# Patient Record
Sex: FEMALE | Race: OTHER | HISPANIC OR LATINO | Employment: PART TIME | URBAN - METROPOLITAN AREA
[De-identification: names, ages, dates, MRNs, and addresses within clinical notes are randomized per-mention and may not be internally consistent; named-entity substitution may affect disease eponyms.]

---

## 2021-06-10 ENCOUNTER — HOSPITAL ENCOUNTER (EMERGENCY)
Facility: HOSPITAL | Age: 36
Discharge: HOME | End: 2021-06-10
Attending: EMERGENCY MEDICINE | Admitting: EMERGENCY MEDICINE
Payer: MEDICARE

## 2021-06-10 VITALS
OXYGEN SATURATION: 100 % | WEIGHT: 130 LBS | HEIGHT: 63 IN | HEART RATE: 70 BPM | TEMPERATURE: 98.3 F | DIASTOLIC BLOOD PRESSURE: 82 MMHG | RESPIRATION RATE: 16 BRPM | SYSTOLIC BLOOD PRESSURE: 126 MMHG | BODY MASS INDEX: 23.04 KG/M2

## 2021-06-10 DIAGNOSIS — J06.9 VIRAL UPPER RESPIRATORY TRACT INFECTION: Primary | ICD-10-CM

## 2021-06-10 LAB — SARS-COV-2 RNA RESP QL NAA+PROBE: NEGATIVE

## 2021-06-10 PROCEDURE — 87635 SARS-COV-2 COVID-19 AMP PRB: CPT | Performed by: EMERGENCY MEDICINE

## 2021-06-10 PROCEDURE — 99282 EMERGENCY DEPT VISIT SF MDM: CPT | Performed by: EMERGENCY MEDICINE

## 2021-06-10 PROCEDURE — 99283 EMERGENCY DEPT VISIT LOW MDM: CPT

## 2021-06-10 NOTE — ED PROVIDER NOTES
History  Chief Complaint   Patient presents with    Sore Throat     Pt reports "itchy throat" and runny nose since Sunday  This is a 57-year-old female presenting to the ED for evaluation of sore, itchy throat with runny nose and nasal congestion for the past 4 days since Sunday  Denies any fevers or chills, no vomiting or diarrhea  She is concerned that she and her kids may have COVID-23 and would like to be tested  History provided by:  Patient   used: No        None       History reviewed  No pertinent past medical history  History reviewed  No pertinent surgical history  History reviewed  No pertinent family history  I have reviewed and agree with the history as documented  E-Cigarette/Vaping    E-Cigarette Use Never User      E-Cigarette/Vaping Substances     Social History     Tobacco Use    Smoking status: Never Smoker    Smokeless tobacco: Never Used   Substance Use Topics    Alcohol use: Yes     Frequency: Monthly or less    Drug use: Never       Review of Systems   All other systems reviewed and are negative  Physical Exam  Physical Exam  Vitals signs and nursing note reviewed  Constitutional:       Appearance: Normal appearance  She is well-developed and normal weight  HENT:      Head: Normocephalic and atraumatic  Right Ear: External ear normal       Left Ear: External ear normal       Nose: Nose normal       Mouth/Throat:      Mouth: Mucous membranes are moist  No oral lesions  Pharynx: Oropharynx is clear  No pharyngeal swelling, oropharyngeal exudate, posterior oropharyngeal erythema or uvula swelling  Tonsils: No tonsillar exudate or tonsillar abscesses  Eyes:      Extraocular Movements: Extraocular movements intact  Conjunctiva/sclera: Conjunctivae normal       Pupils: Pupils are equal, round, and reactive to light  Neck:      Musculoskeletal: Normal range of motion and neck supple     Cardiovascular:      Rate and Rhythm: Normal rate and regular rhythm  Pulses: Normal pulses  Heart sounds: Normal heart sounds  Pulmonary:      Effort: Pulmonary effort is normal  No respiratory distress  Breath sounds: Normal breath sounds  No wheezing, rhonchi or rales  Abdominal:      General: Abdomen is flat  Bowel sounds are normal  There is no distension  Palpations: Abdomen is soft  Tenderness: There is no abdominal tenderness  Musculoskeletal: Normal range of motion  General: No swelling or tenderness  Right lower leg: No edema  Left lower leg: No edema  Skin:     General: Skin is warm and dry  Capillary Refill: Capillary refill takes less than 2 seconds  Neurological:      General: No focal deficit present  Mental Status: She is alert and oriented to person, place, and time  Mental status is at baseline  Psychiatric:         Mood and Affect: Mood normal          Behavior: Behavior normal          Vital Signs  ED Triage Vitals [06/10/21 1354]   Temperature Pulse Respirations Blood Pressure SpO2   98 3 °F (36 8 °C) 70 16 126/82 100 %      Temp Source Heart Rate Source Patient Position - Orthostatic VS BP Location FiO2 (%)   Oral Monitor -- -- --      Pain Score       6           Vitals:    06/10/21 1354   BP: 126/82   Pulse: 70         Visual Acuity      ED Medications  Medications - No data to display    Diagnostic Studies  Results Reviewed     Procedure Component Value Units Date/Time    Novel Coronavirus (Covid-19),PCR [facility] - 2 Hour Stat [[id number]]  (Normal) Collected: 06/10/21 1431    Lab Status: Final result Specimen: Nares from Nasopharyngeal Swab Updated: 06/10/21 1541     SARS-CoV-2 Negative    Narrative:       The specimen collection materials, transport medium, and/or testing methodology utilized in the production of these test results have been proven to be reliable in a limited validation with an abbreviated program under the Emergency Utilization Authorization provided by the FDA  Testing reported as "Presumptive positive" will be confirmed with secondary testing to ensure result accuracy  Clinical caution and judgement should be used with the interpretation of these results with consideration of the clinical impression and other laboratory testing  Testing reported as "Positive" or "Negative" has been proven to be accurate according to standard laboratory validation requirements  All testing is performed with control materials showing appropriate reactivity at standard intervals  No orders to display              Procedures  Procedures         ED Course                                           MDM  Number of Diagnoses or Management Options  Viral upper respiratory tract infection: new and requires workup     Amount and/or Complexity of Data Reviewed  Clinical lab tests: ordered and reviewed    Risk of Complications, Morbidity, and/or Mortality  Presenting problems: low  Diagnostic procedures: low  Management options: low        Disposition  Final diagnoses:   Viral upper respiratory tract infection     Time reflects when diagnosis was documented in both MDM as applicable and the Disposition within this note     Time User Action Codes Description Comment    6/10/2021  2:22 PM Melania Rodgers Add [J06 9] Viral upper respiratory tract infection       ED Disposition     ED Disposition Condition Date/Time Comment    Discharge Stable Thu Aramis 10, 2021  2:22 PM Mary Face discharge to home/self care  Follow-up Information    None         There are no discharge medications for this patient  No discharge procedures on file      PDMP Review     None          ED Provider  Electronically Signed by           Jerrell Griffiths DO  06/10/21 9249

## 2021-06-10 NOTE — ED NOTES
Pt asked for prescription  Educated pt that there is no medication we give for COVID  Pt speaking with kids about going for ice cream   Educated pt regarding quarantining until tests result  Pt asked what if they are positive, explained there is a longer quarantine period and they would need to stay home until then  Pt asked if they can take what they have been taking  Educated pt that OTC meds for symptom control are what we recommend and if what they are taking helps that would be fine        [address], RN  06/10/21 9476

## 2021-06-10 NOTE — DISCHARGE INSTRUCTIONS
Take vitamin supplements if you have covid-19:  Vitamin D3: 2000 IU by mouth, every day  Vitamin C: 1 gram by mouth, twice a day  Zinc: 220 mg every day  Multivitamin daily

## 2021-06-10 NOTE — Clinical Note
Antoine Heath was seen and treated in our emergency department on 6/10/2021  Diagnosis:     Mane Hussein  may return to work on return date  She may return on this date: 06/16/2021    If you test positive for covid-19, you need to be out of work and isolate for 10 days from symptom onset  If you test negative for covid-19, you may return to work tomorrow as long as you are fever-free  If you have any questions or concerns, please don't hesitate to call        Arnulfo Lipscomb DO    ______________________________           _______________          _______________  Hospital Representative                              Date                                Time

## 2022-06-19 ENCOUNTER — APPOINTMENT (EMERGENCY)
Dept: RADIOLOGY | Facility: HOSPITAL | Age: 37
End: 2022-06-19
Payer: MEDICARE

## 2022-06-19 ENCOUNTER — APPOINTMENT (EMERGENCY)
Dept: CT IMAGING | Facility: HOSPITAL | Age: 37
End: 2022-06-19
Payer: MEDICARE

## 2022-06-19 ENCOUNTER — HOSPITAL ENCOUNTER (EMERGENCY)
Facility: HOSPITAL | Age: 37
Discharge: HOME | End: 2022-06-19
Attending: EMERGENCY MEDICINE | Admitting: EMERGENCY MEDICINE
Payer: MEDICARE

## 2022-06-19 VITALS
TEMPERATURE: 97.5 F | HEART RATE: 78 BPM | HEIGHT: 62 IN | RESPIRATION RATE: 16 BRPM | OXYGEN SATURATION: 100 % | BODY MASS INDEX: 25.76 KG/M2 | DIASTOLIC BLOOD PRESSURE: 84 MMHG | SYSTOLIC BLOOD PRESSURE: 135 MMHG | WEIGHT: 140 LBS

## 2022-06-19 DIAGNOSIS — S16.1XXA CERVICAL MUSCLE STRAIN, INITIAL ENCOUNTER: ICD-10-CM

## 2022-06-19 DIAGNOSIS — M54.2 NECK PAIN ON RIGHT SIDE: Primary | ICD-10-CM

## 2022-06-19 DIAGNOSIS — M54.10 RADICULOPATHY: ICD-10-CM

## 2022-06-19 DIAGNOSIS — M62.838 TRAPEZIUS MUSCLE SPASM: ICD-10-CM

## 2022-06-19 LAB
EXT PREG TEST URINE: NEGATIVE
EXT. CONTROL ED NAV: NORMAL

## 2022-06-19 PROCEDURE — 81025 URINE PREGNANCY TEST: CPT | Performed by: STUDENT IN AN ORGANIZED HEALTH CARE EDUCATION/TRAINING PROGRAM

## 2022-06-19 PROCEDURE — 99284 EMERGENCY DEPT VISIT MOD MDM: CPT

## 2022-06-19 PROCEDURE — 96372 THER/PROPH/DIAG INJ SC/IM: CPT

## 2022-06-19 PROCEDURE — 20552 NJX 1/MLT TRIGGER POINT 1/2: CPT | Performed by: STUDENT IN AN ORGANIZED HEALTH CARE EDUCATION/TRAINING PROGRAM

## 2022-06-19 PROCEDURE — 71045 X-RAY EXAM CHEST 1 VIEW: CPT

## 2022-06-19 PROCEDURE — 93005 ELECTROCARDIOGRAM TRACING: CPT

## 2022-06-19 PROCEDURE — 99284 EMERGENCY DEPT VISIT MOD MDM: CPT | Performed by: STUDENT IN AN ORGANIZED HEALTH CARE EDUCATION/TRAINING PROGRAM

## 2022-06-19 PROCEDURE — 72125 CT NECK SPINE W/O DYE: CPT

## 2022-06-19 PROCEDURE — G1004 CDSM NDSC: HCPCS

## 2022-06-19 RX ORDER — METHOCARBAMOL 500 MG/1
500 TABLET, FILM COATED ORAL 2 TIMES DAILY
Qty: 20 TABLET | Refills: 0 | Status: SHIPPED | OUTPATIENT
Start: 2022-06-19

## 2022-06-19 RX ORDER — ACETAMINOPHEN 500 MG
1000 TABLET ORAL EVERY 6 HOURS PRN
Qty: 40 TABLET | Refills: 0 | Status: SHIPPED | OUTPATIENT
Start: 2022-06-19

## 2022-06-19 RX ORDER — NAPROXEN 250 MG/1
250 TABLET ORAL 2 TIMES DAILY PRN
Qty: 30 TABLET | Refills: 0 | Status: SHIPPED | OUTPATIENT
Start: 2022-06-19

## 2022-06-19 RX ORDER — ROPIVACAINE HYDROCHLORIDE 5 MG/ML
5 INJECTION, SOLUTION EPIDURAL; INFILTRATION; PERINEURAL ONCE
Status: COMPLETED | OUTPATIENT
Start: 2022-06-19 | End: 2022-06-19

## 2022-06-19 RX ORDER — KETOROLAC TROMETHAMINE 30 MG/ML
15 INJECTION, SOLUTION INTRAMUSCULAR; INTRAVENOUS ONCE
Status: COMPLETED | OUTPATIENT
Start: 2022-06-19 | End: 2022-06-19

## 2022-06-19 RX ORDER — TRAMADOL HYDROCHLORIDE 50 MG/1
50 TABLET ORAL ONCE
Status: COMPLETED | OUTPATIENT
Start: 2022-06-19 | End: 2022-06-19

## 2022-06-19 RX ORDER — DIAZEPAM 5 MG/1
5 TABLET ORAL ONCE
Status: COMPLETED | OUTPATIENT
Start: 2022-06-19 | End: 2022-06-19

## 2022-06-19 RX ORDER — LIDOCAINE 50 MG/G
1 PATCH TOPICAL ONCE
Status: DISCONTINUED | OUTPATIENT
Start: 2022-06-19 | End: 2022-06-19 | Stop reason: HOSPADM

## 2022-06-19 RX ADMIN — ROPIVACAINE HYDROCHLORIDE 5 ML: 5 INJECTION, SOLUTION EPIDURAL; INFILTRATION; PERINEURAL at 07:56

## 2022-06-19 RX ADMIN — LIDOCAINE 5% 1 PATCH: 700 PATCH TOPICAL at 05:14

## 2022-06-19 RX ADMIN — TRAMADOL HYDROCHLORIDE 50 MG: 50 TABLET, COATED ORAL at 06:14

## 2022-06-19 RX ADMIN — KETOROLAC TROMETHAMINE 15 MG: 30 INJECTION, SOLUTION INTRAMUSCULAR at 05:12

## 2022-06-19 RX ADMIN — DIAZEPAM 5 MG: 5 TABLET ORAL at 05:11

## 2022-06-19 NOTE — ED ATTENDING ATTESTATION
6/19/2022      Final Diagnosis:  1  Neck pain on right side    2  Radiculopathy    3  Cervical muscle strain, initial encounter    4  Trapezius muscle spasm      ED Course as of 06/19/22 0743   Sun Jun 19, 2022   9262 S/o Pain in neck for a while  Now with pain, subjective weakness, paresthesias  Cervical radiculopathy  Paresthesias in fingers  Toradol, valium, ultram given  0731  3 months of paroxysmal pain of right trapezius pain  ocassional twitching of her right pinkie  Now with worsening radiculopathy like symptoms over last couple days  No inciting event  Patient has a normal examination regards to muscular strength and sensation  Will obtain ECG, CXR, will refer to comprehensive spine  Bud Nolen DO, saw and evaluated the patient  All available labs and X-rays were ordered by me or the resident and have been reviewed by myself  I discussed the patient with the resident / non-physician and agree with the resident's / non-physician practitioner's findings and plan as documented in the resident's / non-physician practicitioner's note, except where noted  At this point, I agree with the current assessment done in the ED  I was present during key portions of all procedures performed unless otherwise stated  Chief Complaint   Patient presents with    Arm Pain     Pt states has had right arm pain since yesterday awoke this morning with increasing pain, and tingling sensation  Denies weakness     This is a 43 y o  female presenting for evaluation of right trapezius pain for 3 months paroxysmally  Now with radiculopathy like symptoms for last 2 days  No inciting event  No red flags  Notes the pain in her right neck occasionally radiates to the right lateral anterior chest   Primarily radiates down the right arm  Pain is worse with palpation in the neck and movement of her head  Primarily complaining of paresthesias and pain  No PMH of PSH      PMH:   has no past medical history on file  PSH:   has no past surgical history on file  Social:  Social History     Substance and Sexual Activity   Alcohol Use Yes     Social History     Tobacco Use   Smoking Status Never Smoker   Smokeless Tobacco Never Used     Social History     Substance and Sexual Activity   Drug Use Never     PE:  Vitals:    06/19/22 0443 06/19/22 0718   BP: 148/83 135/84   BP Location: Right arm Right arm   Pulse: 64 78   Resp: 16 16   Temp: 97 5 °F (36 4 °C)    TempSrc: Oral    SpO2: 100% 100%   Weight: 63 5 kg (140 lb)    Height: 5' 2" (1 575 m)      Physical Exam     Physical Exam  Vitals and nursing note reviewed  Constitutional:       General: She is not in acute distress  Appearance: Normal appearance  She is not ill-appearing  HENT:      Head: Normocephalic and atraumatic  Right Ear: External ear normal       Left Ear: External ear normal       Nose: Nose normal       Mouth/Throat:      Mouth: Mucous membranes are moist    Eyes:      General:         Right eye: No discharge  Left eye: No discharge  Conjunctiva/sclera: Conjunctivae normal    Cardiovascular:      Rate and Rhythm: Normal rate and regular rhythm  Pulses: Normal pulses  Heart sounds: No murmur heard  Comments: Normal radial pulses  Pulmonary:      Effort: Pulmonary effort is normal       Breath sounds: Normal breath sounds  Abdominal:      General: Abdomen is flat  There is no distension  Tenderness: There is no abdominal tenderness  Musculoskeletal:         General: Tenderness present  Normal range of motion  Cervical back: Normal range of motion  Comments: Pain on palpation of the right lateral neck, pain on palpation of the right trapezius, pain on palpation of the right musculature overlying the scapula  No central spinal tenderness  Skin:     General: Skin is warm  Capillary Refill: Capillary refill takes less than 2 seconds  Findings: No rash     Neurological: General: No focal deficit present  Mental Status: She is alert  Mental status is at baseline  Sensory: No sensory deficit  Motor: No weakness  Comments: Sensation is intact to light touch throughout the upper extremities including on the right arm  Median, radial, ulnar nerves are intact to motor and sensation  Psychiatric:         Mood and Affect: Mood normal          Behavior: Behavior normal            A:  - likely musculoskeletal pain in nature  Patient has exquisite pain on palpation in the right trapezius which had appears to be spasms on examination  Patient also has pain on palpation of the muscles overlying the right scapula and the right lateral cervical spine  Normal MSK stength and neuro exam  Normal pulses in right upper extremity  Reportedly radiates to the right lateral chest wall and the right arm  Worse with movement  MDM:  Likely musculoskeletal in nature  Likely radiculopathy from spasm  Differential also includes stenosis  Radiation of the chest will evaluate for pneumonia, pneumothorax, other acute intrathoracic process  Evaluate for arrhythmia  P:  - patient received benzodiazepine, NSAID, lidocaine patch, narcotic prior to my arrival    - awaiting results of CT of the neck on my arrival   CT of the neck without significant findings     -Will perform trigger point injections  With radiation to the lateral chest will add chest x-ray to evaluate for pneumonia, pneumothorax  Will evaluate with ECG to evaluate for arrhythmia  Workup without acute findings  Will discharge home  Follow-up with comprehensive spine due to length of time that the patient has been having neck pain and the mild degenerative changes or found on the CT scan     - 13 point ROS was performed and all are normal unless stated in the history above  - Nursing note reviewed  Vitals reviewed     - Orders placed by myself and/or advanced practitioner / resident     - Previous chart was reviewed  - No language barrier    - History obtained from patient  - There are no limitations to the history obtained  Code Status: No Order  Advance Directive and Living Will:      Power of :    POLST:      Medications   lidocaine (LIDODERM) 5 % patch 1 patch (1 patch Topical Medication Applied 6/19/22 0514)   ketorolac (TORADOL) injection 15 mg (15 mg Intramuscular Given 6/19/22 0512)   diazepam (VALIUM) tablet 5 mg (5 mg Oral Given 6/19/22 0511)   traMADol (ULTRAM) tablet 50 mg (50 mg Oral Given 6/19/22 0614)   ropivacaine (NAROPIN) 0 5 % injection 5 mL (5 mL Infiltration Given by Other 6/19/22 0756)     XR chest 1 view portable   ED Interpretation   No acute cardiopulmonary process  CT cervical spine without contrast   Final Result      No fracture or traumatic malalignment  Bilateral paranasal sinus opacification with air-fluid levels  Acute sinusitis should be considered in the right clinical setting  Workstation performed: [id number]           Orders Placed This Encounter   Procedures        Trigger Injection 1 or 2 muscles    CT cervical spine without contrast    XR chest 1 view portable    Ambulatory Referral to [facility]    POCT pregnancy, urine    ECG 12 lead     Labs Reviewed   POCT PREGNANCY, URINE - Normal       Result Value Ref Range Status    EXT PREG TEST UR (Ref: Negative) negative   Final    Control valid   Final     Time reflects when diagnosis was documented in both MDM as applicable and the Disposition within this note     Time User Action Codes Description Comment    6/19/2022  7:38 AM Roanna Cashing Add [M54 2] Neck pain on right side     6/19/2022  7:38 AM Roanna Cashing Add [M54 10] Radiculopathy     6/19/2022  7:59 AM Homar Rios Add [S16  1XXA] Cervical muscle strain, initial encounter     6/19/2022  7:59 AM Roanna Cashing Add [F29 617] Trapezius muscle spasm       ED Disposition     ED Disposition Discharge    Condition   Stable    Date/Time   Sun Jun 19, 2022  7:58 AM    Comment   Landon Umanzor discharge to home/self care  Follow-up Information     Follow up With Specialties Details Why Contact Info Additional Information    [facility] Physical Therapy Schedule an appointment as soon as possible for a visit  For re-evaluation as soon as possible     [address] Emergency Department Emergency Medicine Schedule an appointment as soon as possible for a visit  If symptoms worsen [address] [facility],[phone]  [address] [place] [address] Emergency Department, [address], [address]        Discharge Medication List as of 6/19/2022  8:02 AM      START taking these medications    Details   acetaminophen (TYLENOL) 500 mg tablet Take 2 tablets (1,000 mg total) by mouth every 6 (six) hours as needed for mild pain for up to 20 doses, Starting Sun 6/19/2022, Normal      methocarbamol (ROBAXIN) 500 mg tablet Take 1 tablet (500 mg total) by mouth 2 (two) times a day, Starting Sun 6/19/2022, Normal      naproxen (Naprosyn) 250 mg tablet Take 1 tablet (250 mg total) by mouth 2 (two) times a day as needed for mild pain for up to 30 doses, Starting Sun 6/19/2022, Normal             None       Portions of the record may have been created with voice recognition software  Occasional wrong word or "sound a like" substitutions may have occurred due to the inherent limitations of voice recognition software  Read the chart carefully and recognize, using context, where substitutions have occurred        Critical Care Time  Trigger Injection 1 or 2 muscles    Date/Time: 6/19/2022 7:56 AM  Performed by: Taylor Robledo DO  Authorized by: Taylor Robledo DO     Patient location:  ED  Other Assisting Provider: No    Consent:     Consent obtained:  Verbal    Consent given by:  Patient    Risks discussed: Allergic reaction, infection, nerve damage, bleeding, pain and unsuccessful block    Alternatives discussed:  No treatment  Universal protocol:     Procedure explained and questions answered to patient or proxy's satisfaction: yes      Relevant documents present and verified: yes      Test results available and properly labeled: yes      Radiology Images displayed and confirmed  If images not available, report reviewed : yes      Required blood products, implants, devices, and special equipment available: no      Site marked: no      Time out called: yes      Patient identity confirmed:  Verbally with patient  Location:     Body area:  Neck    Injection Trigger Points:  3  Pre-procedure details:     Skin preparation:  Antiseptic wash  Skin anesthesia:     Skin anesthesia method:  None  Procedure details:     Needle gauge:  27 G    Block anesthetic: ropivicaine 0 5  Steroid injected:  None    Additive injected:  None    Injection procedure:  Anatomic landmarks identified, incremental injection, negative aspiration for blood, anatomic landmarks palpated and introduced needle  Post-procedure details:     Dressing:  None    Outcome:  Pain unchanged    Patient tolerance of procedure:   Tolerated well, no immediate complications  Comments:      3 ml ropivicaine given  total

## 2022-06-19 NOTE — Clinical Note
Swapnil Lackeyhand was seen and treated in our emergency department on 6/19/2022  Diagnosis: Cervical strain    Minnie Cullen  may return to work on return date  She may return on this date: 06/21/2022         If you have any questions or concerns, please don't hesitate to call        Gloria Galan DO    ______________________________           _______________          _______________  Hospital Representative                              Date                                Time

## 2022-06-19 NOTE — ED PROVIDER NOTES
History  Chief Complaint   Patient presents with    Arm Pain     Pt states has had right arm pain since yesterday awoke this morning with increasing pain, and tingling sensation  Denies weakness     Patient is a 78-year-old female presents emergency department today with complaint of right shoulder and arm pain x1 hour  Patient states over the past 3 months she has been having intermittent pain in the back of her right shoulder but upon wakening this morning noticed worsening of pain that radiates into her right arm, described as sharp, rated 9/10, constant  She also admits to associated tingling and pins and needles sensation in her fingers  States she took Tylenol for pain yesterday morning but has not taken anything since  She denies any chest pain, fevers or chills, abdominal pain, nausea vomiting, weakness, headache, blue or pale discoloration of the fingertips, decreased range of motion of the affected arm     She denies any recent trauma or injuries and denies any heavy lifting  States she may have slept wrong  She denies any red flag symptoms including bowel or bladder incontinence, urinary retention, saddle anesthesia, history of IV drug use  None       History reviewed  No pertinent past medical history  History reviewed  No pertinent surgical history  History reviewed  No pertinent family history  I have reviewed and agree with the history as documented  E-Cigarette/Vaping    E-Cigarette Use Never User      E-Cigarette/Vaping Substances     Social History     Tobacco Use    Smoking status: Never Smoker    Smokeless tobacco: Never Used   Vaping Use    Vaping Use: Never used   Substance Use Topics    Alcohol use: Yes    Drug use: Never       Review of Systems   Constitutional: Negative for chills and fever  Respiratory: Negative for cough, chest tightness and shortness of breath  Cardiovascular: Negative for chest pain     Gastrointestinal: Negative for abdominal pain, diarrhea, nausea and vomiting  Genitourinary: Negative  Musculoskeletal: Positive for arthralgias (Right shoulder and arm) and neck pain  Negative for back pain and neck stiffness  Neurological: Positive for numbness  Negative for dizziness, weakness, light-headedness and headaches  All other systems reviewed and are negative  Physical Exam  Physical Exam  Vitals and nursing note reviewed  Constitutional:       General: She is not in acute distress  Appearance: Normal appearance  She is not ill-appearing  HENT:      Head: Normocephalic and atraumatic  Eyes:      Extraocular Movements: Extraocular movements intact  Conjunctiva/sclera: Conjunctivae normal       Pupils: Pupils are equal, round, and reactive to light  Neck:        Comments: Midline cervical and right-sided paraspinal and lateral muscular tenderness of the upper trapezius extending into the right shoulder, no palpable bony step-offs  Cardiovascular:      Rate and Rhythm: Normal rate and regular rhythm  Pulses:           Radial pulses are 2+ on the right side and 2+ on the left side  Heart sounds: Normal heart sounds  No murmur heard  Pulmonary:      Effort: Pulmonary effort is normal  No respiratory distress  Breath sounds: Normal breath sounds  No decreased breath sounds or wheezing  Chest:      Chest wall: No tenderness  Abdominal:      General: Bowel sounds are normal       Palpations: Abdomen is soft  Tenderness: There is no abdominal tenderness  There is no guarding or rebound  Negative signs include Padilla's sign  Musculoskeletal:      Cervical back: Neck supple  Spasms, tenderness and bony tenderness present  No rigidity  Pain with movement and muscular tenderness present  No spinous process tenderness  Thoracic back: Spasms, tenderness and bony tenderness present  Normal range of motion  Lumbar back: Normal       Right lower leg: No edema  Left lower leg: No edema  Comments: Tenderness palpation of the the lower C and upper T-spine with right sided paraspinal tendernes, no palpable step-offs  Right-sided paraspinal muscles are tense consistent with possible muscle spasm   Lymphadenopathy:      Cervical: No cervical adenopathy  Neurological:      General: No focal deficit present  Mental Status: She is alert and oriented to person, place, and time  Sensory: Sensation is intact  No sensory deficit  Motor: Motor function is intact  No weakness  Comments: Muscle strength 5/5 in bilateral upper extremities with  strength and shoulder/elbow flexion extension  Full sensation to light touch felt in upper extremities bilaterally without deficit  Neurovascularly intact upper extremities  Psychiatric:         Attention and Perception: Attention normal          Mood and Affect: Mood normal          Speech: Speech normal          Behavior: Behavior normal  Behavior is cooperative  Thought Content:  Thought content normal          Judgment: Judgment normal          Vital Signs  ED Triage Vitals [06/19/22 0443]   Temperature Pulse Respirations Blood Pressure SpO2   97 5 °F (36 4 °C) 64 16 148/83 100 %      Temp Source Heart Rate Source Patient Position - Orthostatic VS BP Location FiO2 (%)   Oral Monitor Lying Right arm --      Pain Score       10 - Worst Possible Pain           Vitals:    06/19/22 0443   BP: 148/83   Pulse: 64   Patient Position - Orthostatic VS: Lying         Visual Acuity      ED Medications  Medications   lidocaine (LIDODERM) 5 % patch 1 patch (1 patch Topical Medication Applied 6/19/22 0514)   ketorolac (TORADOL) injection 15 mg (15 mg Intramuscular Given 6/19/22 0512)   diazepam (VALIUM) tablet 5 mg (5 mg Oral Given 6/19/22 0511)   traMADol (ULTRAM) tablet 50 mg (50 mg Oral Given 6/19/22 0107)       Diagnostic Studies  Results Reviewed     Procedure Component Value Units Date/Time    POCT pregnancy, urine [[id number]]  (Normal) Resulted: 06/19/22 0510    Lab Status: Final result Updated: 06/19/22 0511     EXT PREG TEST UR (Ref: Negative) negative     Control valid                 CT cervical spine without contrast    (Results Pending)              Procedures  Procedures         ED Course  ED Course as of 06/19/22 0700   Sun Jun 19, 2022   0608 On re-evaluation patient notes no improvement in pain, significant tenderness to palpation of the lower C-spine and paraspinal muscles   0651 On re-evaluation patient states her pain is slightly improved, still complaining of paresthesias in the right hand  Transfer of care given to Dr Michelle Rollins pending CT results and disposition  PERC Rule for PE    Flowsheet Row Most Recent Value   PERC Rule for PE    Age >=50 0 Filed at: 06/19/2022 0546   HR >=100 0 Filed at: 06/19/2022 0546   O2 Sat on room air < 95% 0 Filed at: 06/19/2022 0546   History of PE or DVT 0 Filed at: 06/19/2022 0546   Recent trauma or surgery 0 Filed at: 06/19/2022 0546   Hemoptysis 0 Filed at: 06/19/2022 0546   Exogenous estrogen 0 Filed at: 06/19/2022 0546   Unilateral leg swelling 0 Filed at: 06/19/2022 0546   PERC Rule for PE Results 0 Filed at: 06/19/2022 0546                            MDM  Number of Diagnoses or Management Options  Diagnosis management comments: Patient is a 41-year-old female presents emergency department today via EMS for concern for right neck and shoulder pain radiating into her right arm  Examination showed tenderness palpation of the lower C and upper T-spine with right-sided paraspinal muscles along with tenderness extending into the right shoulder and down the arm, muscle strength is 5/5 in upper and lower extremities bilaterally with full sensations to light touch, neurovascularly intact upper extremities, no noted deficits but pt complaining of paresthesias  Right-sided paraspinal muscles were tense concerning and consistent with possible muscle spasm  No red flag symptoms  Plan to treat conservatively for muscle spasm with lidocaine patch, Toradol, and Valium  Urine pregnancy ordered and was negative  I considered ACS but patient is a 73-year-old female with no prior or current cardiac history or risk factors and no chest pain or shortness of breath  I considered PE but patient has a PERC score of 0 and no shortness of breath or inspiratory chest pain  Considered gallbladder etiology due to location of patient's pain concerning for possible referred pain patient has right upper quadrant abdominal tenderness  On re-evaluation patient had no improvement of symptoms, due to complaints of paresthesias with midline tenderness plan to obtain CT neck to determine likely cause of radicular pain  Transfer of care given to Dr Jorge Alberto Ryan pending CT C spine, reevaluation and dispo  Amount and/or Complexity of Data Reviewed  Clinical lab tests: reviewed and ordered  Tests in the radiology section of CPT®: ordered    Patient Progress  Patient progress: stable      Disposition  Final diagnoses:   None     ED Disposition     None      Follow-up Information    None         Patient's Medications    No medications on file       No discharge procedures on file      PDMP Review     None          ED Provider  Electronically Signed by           Avril Jones PA-C  06/19/22 2051

## 2022-06-20 ENCOUNTER — TELEPHONE (OUTPATIENT)
Dept: PHYSICAL THERAPY | Facility: OTHER | Age: 37
End: 2022-06-20

## 2022-06-20 ENCOUNTER — NURSE TRIAGE (OUTPATIENT)
Dept: PHYSICAL THERAPY | Facility: OTHER | Age: 37
End: 2022-06-20

## 2022-06-20 DIAGNOSIS — M54.2 ACUTE NECK PAIN: Primary | ICD-10-CM

## 2022-06-20 LAB
ATRIAL RATE: 66 BPM
P AXIS: 76 DEGREES
PR INTERVAL: 151 MS
QRS AXIS: 66 DEGREES
QRSD INTERVAL: 71 MS
QT INTERVAL: 393 MS
QTC INTERVAL: 421 MS
T WAVE AXIS: 50 DEGREES
VENTRICULAR RATE: 69 BPM

## 2022-06-20 PROCEDURE — 93010 ELECTROCARDIOGRAM REPORT: CPT | Performed by: INTERNAL MEDICINE

## 2022-06-20 NOTE — TELEPHONE ENCOUNTER
Additional Information   Negative: Is this related to a work injury?  Negative: Is this related to an MVA?  Negative: Are you currently recieving homecare services? Background - Initial Assessment  Clinical complaint: Pain is right neck pain down right shoulder and downright arm  Has tingling in right fingers  Pain started 6/19/22   [facility]I Was seenin the ED  Date of onset: 6/19/22  Frequency of pain: constant  Quality of pain: stabbing and tingling "constantly there won't go away", and stabbing    Protocols used: [facility]B COMPREHENSIVE SPINE PROGRAM PROTOCOL

## 2022-06-20 NOTE — TELEPHONE ENCOUNTER
Additional Information   Negative: Has the patient had unexplained weight loss?  Negative: Does the patient have a fever?  Negative: Is the patient experiencing urine retention?  Negative: Has the patient experienced major trauma? (fall from height, high speed collision, direct blow to spine) and is also experiencing nausea, light-headedness, or loss of consciousness?  Negative: Is the patient experiencing blood in sputum?  Negative: Is the patient experiencing acute drop foot or paralysis?  Commented on: Background - Initial Assessment     Patient states she has had neck pain for about 3 months but it got worse 6/19/22 and now has numbness and tingling of the right arm and fingers  Protocols used: SL AMB COMPREHENSIVE SPINE PROGRAM PROTOCOL    This RN did review in detail the Comprehensive Spine Program and what we can provide for their neck pain  Patient is agreeable to being triaged by this RN and would like to proceed with Physical Therapy  Referral was placed for Physical Therapy at the [facility] site  Patients information was sent to the  to make evaluation appointment  Patient made aware that the PT office  will be calling to schedule the appointment  Patient was provided with the phone number to the PT office  No further questions and/or concerns were voiced by the patient at this time  Patient states understanding of the referral that was placed  Referral Closed

## 2022-06-27 ENCOUNTER — APPOINTMENT (EMERGENCY)
Dept: CT IMAGING | Facility: HOSPITAL | Age: 37
End: 2022-06-27
Payer: MEDICARE

## 2022-06-27 ENCOUNTER — HOSPITAL ENCOUNTER (EMERGENCY)
Facility: HOSPITAL | Age: 37
Discharge: HOME | End: 2022-06-27
Attending: EMERGENCY MEDICINE | Admitting: EMERGENCY MEDICINE
Payer: MEDICARE

## 2022-06-27 VITALS
HEART RATE: 59 BPM | SYSTOLIC BLOOD PRESSURE: 114 MMHG | TEMPERATURE: 98.1 F | OXYGEN SATURATION: 99 % | BODY MASS INDEX: 25.76 KG/M2 | DIASTOLIC BLOOD PRESSURE: 87 MMHG | RESPIRATION RATE: 18 BRPM | WEIGHT: 140 LBS | HEIGHT: 62 IN

## 2022-06-27 DIAGNOSIS — M54.13 RADICULOPATHY OF CERVICOTHORACIC REGION: Primary | ICD-10-CM

## 2022-06-27 DIAGNOSIS — M25.519 NECK AND SHOULDER PAIN: ICD-10-CM

## 2022-06-27 DIAGNOSIS — M54.2 NECK AND SHOULDER PAIN: ICD-10-CM

## 2022-06-27 LAB
ALBUMIN SERPL BCP-MCNC: 4.1 G/DL (ref 3.5–5)
ALP SERPL-CCNC: 70 U/L (ref 34–104)
ALT SERPL W P-5'-P-CCNC: 9 U/L (ref 7–52)
ANION GAP SERPL CALCULATED.3IONS-SCNC: 9 MMOL/L (ref 4–13)
AST SERPL W P-5'-P-CCNC: 12 U/L (ref 13–39)
BASOPHILS # BLD AUTO: 0.04 THOUSANDS/ΜL (ref 0–0.1)
BASOPHILS NFR BLD AUTO: 0 % (ref 0–1)
BILIRUB SERPL-MCNC: 0.31 MG/DL (ref 0.2–1)
BUN SERPL-MCNC: 11 MG/DL (ref 5–25)
CALCIUM SERPL-MCNC: 9.1 MG/DL (ref 8.4–10.2)
CARDIAC TROPONIN I PNL SERPL HS: 3 NG/L
CHLORIDE SERPL-SCNC: 103 MMOL/L (ref 96–108)
CO2 SERPL-SCNC: 24 MMOL/L (ref 21–32)
CREAT SERPL-MCNC: 0.62 MG/DL (ref 0.6–1.3)
D DIMER PPP FEU-MCNC: 0.38 UG/ML FEU
EOSINOPHIL # BLD AUTO: 0.14 THOUSAND/ΜL (ref 0–0.61)
EOSINOPHIL NFR BLD AUTO: 2 % (ref 0–6)
ERYTHROCYTE [DISTWIDTH] IN BLOOD BY AUTOMATED COUNT: 13.4 % (ref 11.6–15.1)
GFR SERPL CREATININE-BSD FRML MDRD: 114 ML/MIN/1.73SQ M
GLUCOSE SERPL-MCNC: 79 MG/DL (ref 65–140)
HCT VFR BLD AUTO: 39.3 % (ref 34.8–46.1)
HGB BLD-MCNC: 13 G/DL (ref 11.5–15.4)
IMM GRANULOCYTES # BLD AUTO: 0.04 THOUSAND/UL (ref 0–0.2)
IMM GRANULOCYTES NFR BLD AUTO: 0 % (ref 0–2)
LYMPHOCYTES # BLD AUTO: 2.47 THOUSANDS/ΜL (ref 0.6–4.47)
LYMPHOCYTES NFR BLD AUTO: 28 % (ref 14–44)
MCH RBC QN AUTO: 27.9 PG (ref 26.8–34.3)
MCHC RBC AUTO-ENTMCNC: 33.1 G/DL (ref 31.4–37.4)
MCV RBC AUTO: 84 FL (ref 82–98)
MONOCYTES # BLD AUTO: 0.63 THOUSAND/ΜL (ref 0.17–1.22)
MONOCYTES NFR BLD AUTO: 7 % (ref 4–12)
NEUTROPHILS # BLD AUTO: 5.63 THOUSANDS/ΜL (ref 1.85–7.62)
NEUTS SEG NFR BLD AUTO: 63 % (ref 43–75)
NRBC BLD AUTO-RTO: 0 /100 WBCS
PLATELET # BLD AUTO: 372 THOUSANDS/UL (ref 149–390)
PMV BLD AUTO: 9.4 FL (ref 8.9–12.7)
POTASSIUM SERPL-SCNC: 3.7 MMOL/L (ref 3.5–5.3)
PROT SERPL-MCNC: 7.2 G/DL (ref 6.4–8.4)
RBC # BLD AUTO: 4.66 MILLION/UL (ref 3.81–5.12)
SODIUM SERPL-SCNC: 136 MMOL/L (ref 135–147)
WBC # BLD AUTO: 8.95 THOUSAND/UL (ref 4.31–10.16)

## 2022-06-27 PROCEDURE — 85379 FIBRIN DEGRADATION QUANT: CPT | Performed by: EMERGENCY MEDICINE

## 2022-06-27 PROCEDURE — 80053 COMPREHEN METABOLIC PANEL: CPT | Performed by: EMERGENCY MEDICINE

## 2022-06-27 PROCEDURE — 84484 ASSAY OF TROPONIN QUANT: CPT | Performed by: EMERGENCY MEDICINE

## 2022-06-27 PROCEDURE — G1004 CDSM NDSC: HCPCS

## 2022-06-27 PROCEDURE — 96374 THER/PROPH/DIAG INJ IV PUSH: CPT

## 2022-06-27 PROCEDURE — 85025 COMPLETE CBC W/AUTO DIFF WBC: CPT | Performed by: EMERGENCY MEDICINE

## 2022-06-27 PROCEDURE — 36415 COLL VENOUS BLD VENIPUNCTURE: CPT | Performed by: EMERGENCY MEDICINE

## 2022-06-27 PROCEDURE — 99284 EMERGENCY DEPT VISIT MOD MDM: CPT

## 2022-06-27 PROCEDURE — 72125 CT NECK SPINE W/O DYE: CPT

## 2022-06-27 PROCEDURE — 99284 EMERGENCY DEPT VISIT MOD MDM: CPT | Performed by: EMERGENCY MEDICINE

## 2022-06-27 RX ORDER — ACETAMINOPHEN 325 MG/1
650 TABLET ORAL ONCE
Status: COMPLETED | OUTPATIENT
Start: 2022-06-27 | End: 2022-06-27

## 2022-06-27 RX ORDER — KETOROLAC TROMETHAMINE 30 MG/ML
30 INJECTION, SOLUTION INTRAMUSCULAR; INTRAVENOUS ONCE
Status: COMPLETED | OUTPATIENT
Start: 2022-06-27 | End: 2022-06-27

## 2022-06-27 RX ORDER — DIAZEPAM 5 MG/1
5 TABLET ORAL ONCE
Status: COMPLETED | OUTPATIENT
Start: 2022-06-27 | End: 2022-06-27

## 2022-06-27 RX ORDER — NAPROXEN 500 MG/1
500 TABLET ORAL 2 TIMES DAILY WITH MEALS
Qty: 30 TABLET | Refills: 0 | Status: SHIPPED | OUTPATIENT
Start: 2022-06-27

## 2022-06-27 RX ORDER — METHYLPREDNISOLONE 4 MG/1
TABLET ORAL
Qty: 21 TABLET | Refills: 0 | Status: SHIPPED | OUTPATIENT
Start: 2022-06-27

## 2022-06-27 RX ORDER — LIDOCAINE 50 MG/G
1 PATCH TOPICAL ONCE
Status: DISCONTINUED | OUTPATIENT
Start: 2022-06-27 | End: 2022-06-27 | Stop reason: HOSPADM

## 2022-06-27 RX ADMIN — PREDNISONE 50 MG: 20 TABLET ORAL at 19:36

## 2022-06-27 RX ADMIN — LIDOCAINE 5% 1 PATCH: 700 PATCH TOPICAL at 20:34

## 2022-06-27 RX ADMIN — DIAZEPAM 5 MG: 5 TABLET ORAL at 19:36

## 2022-06-27 RX ADMIN — ACETAMINOPHEN 650 MG: 325 TABLET, FILM COATED ORAL at 20:34

## 2022-06-27 RX ADMIN — KETOROLAC TROMETHAMINE 30 MG: 30 INJECTION, SOLUTION INTRAMUSCULAR at 19:36

## 2022-06-27 NOTE — Clinical Note
Gilmar Alejo was seen and treated in our emergency department on 6/27/2022  Diagnosis:     Blanca Hammans  may return to work on return date  She may return on this date: 06/30/2022         If you have any questions or concerns, please don't hesitate to call        Killian Zurita MD    ______________________________           _______________          _______________  Hospital Representative                              Date                                Time

## 2022-06-27 NOTE — ED PROVIDER NOTES
History  Chief Complaint   Patient presents with    Back Pain     Right sided back spasm 1 week ago, now radiating down right arm and around to chest  Worsening over the past week     Patient presents to emergency department with acute onset right-sided upper back neck shoulder and chest pain  Patient describes as a deep achy spasming pain that has been constant for over a week now  Pain is made worse with certain positions and touching the involved areas  currently at time of evaluation she rates the pain a 9/10  The patient has been taking over-the-counter pain medications as well as a prescribed muscle relaxant but it has not helped  She was seen and evaluated in the emergency department approximately 1 week ago for the same  She states that the pain radiates down to the back of her left arm and is causing some numbness and tingling into her fingers  She denies any other associated systemic symptoms  She denies any trauma to the involved area, any history of malignancy  History provided by:  Patient   used: No         Prior to Admission Medications   Prescriptions Last Dose Informant Patient Reported? Taking?   acetaminophen (TYLENOL) 500 mg tablet 6/27/2022 at Unknown time  No Yes   Sig: Take 2 tablets (1,000 mg total) by mouth every 6 (six) hours as needed for mild pain for up to 20 doses   methocarbamol (ROBAXIN) 500 mg tablet 6/27/2022 at Unknown time  No Yes   Sig: Take 1 tablet (500 mg total) by mouth 2 (two) times a day   naproxen (Naprosyn) 250 mg tablet 6/27/2022 at Unknown time  No Yes   Sig: Take 1 tablet (250 mg total) by mouth 2 (two) times a day as needed for mild pain for up to 30 doses      Facility-Administered Medications: None       History reviewed  No pertinent past medical history  History reviewed  No pertinent surgical history  History reviewed  No pertinent family history    I have reviewed and agree with the history as documented  E-Cigarette/Vaping    E-Cigarette Use Never User      E-Cigarette/Vaping Substances     Social History     Tobacco Use    Smoking status: Never Smoker    Smokeless tobacco: Never Used   Vaping Use    Vaping Use: Never used   Substance Use Topics    Alcohol use: Yes    Drug use: Never       Review of Systems   Constitutional: Negative for fever  Eyes: Negative for visual disturbance  Respiratory: Negative for shortness of breath  Cardiovascular: Negative for palpitations  Gastrointestinal: Negative for abdominal pain, blood in stool, diarrhea, nausea and vomiting  Genitourinary: Negative for dysuria and flank pain  Musculoskeletal: Positive for back pain, myalgias and neck pain  Skin: Negative for rash  Neurological: Negative for light-headedness  All other systems reviewed and are negative  Physical Exam  Physical Exam  Vitals and nursing note reviewed  Constitutional:       General: She is not in acute distress  Appearance: She is well-developed  HENT:      Head: Normocephalic and atraumatic  Mouth/Throat:      Mouth: Mucous membranes are moist    Eyes:      Conjunctiva/sclera: Conjunctivae normal    Neck:      Trachea: Trachea normal      Cardiovascular:      Rate and Rhythm: Normal rate and regular rhythm  Heart sounds: No murmur heard  Pulmonary:      Effort: Pulmonary effort is normal  No respiratory distress  Breath sounds: Normal breath sounds  Abdominal:      Palpations: Abdomen is soft  Tenderness: There is no abdominal tenderness  Musculoskeletal:      Cervical back: Neck supple  Tenderness present  No deformity, rigidity, bony tenderness or crepitus  Pain with movement and muscular tenderness present  Normal range of motion  Thoracic back: No deformity or bony tenderness  Normal range of motion  Back:    Lymphadenopathy:      Cervical: No cervical adenopathy  Skin:     General: Skin is warm and dry        Capillary Refill: Capillary refill takes less than 2 seconds  Neurological:      General: No focal deficit present  Mental Status: She is alert and oriented to person, place, and time  Mental status is at baseline  Cranial Nerves: No cranial nerve deficit  Sensory: No sensory deficit  Motor: No weakness        Deep Tendon Reflexes: Reflexes normal          Vital Signs  ED Triage Vitals   Temperature Pulse Respirations Blood Pressure SpO2   06/27/22 1836 06/27/22 1836 06/27/22 1836 06/27/22 1836 06/27/22 1836   98 1 °F (36 7 °C) 70 18 (!) 181/99 100 %      Temp Source Heart Rate Source Patient Position - Orthostatic VS BP Location FiO2 (%)   06/27/22 1836 06/27/22 2034 06/27/22 2034 06/27/22 2034 --   Oral Monitor Lying Left arm       Pain Score       06/27/22 1836       10 - Worst Possible Pain           Vitals:    06/27/22 1836 06/27/22 2034   BP: (!) 181/99 114/87   Pulse: 70 59   Patient Position - Orthostatic VS:  Lying         Visual Acuity      ED Medications  Medications   lidocaine (LIDODERM) 5 % patch 1 patch (1 patch Topical Medication Applied 6/27/22 2034)   ketorolac (TORADOL) injection 30 mg (30 mg Intravenous Given 6/27/22 1936)   diazepam (VALIUM) tablet 5 mg (5 mg Oral Given 6/27/22 1936)   predniSONE tablet 50 mg (50 mg Oral Given 6/27/22 1936)   acetaminophen (TYLENOL) tablet 650 mg (650 mg Oral Given 6/27/22 2034)       Diagnostic Studies  Results Reviewed     Procedure Component Value Units Date/Time    HS Troponin 0hr (reflex protocol) [[id number]]  (Normal) Collected: 06/27/22 1933    Lab Status: Final result Specimen: Blood from Arm, Left Updated: 06/27/22 2004     hs TnI 0hr 3 ng/L     Comprehensive metabolic panel [[id number]]  (Abnormal) Collected: 06/27/22 1933    Lab Status: Final result Specimen: Blood from Arm, Left Updated: 06/27/22 1957     Sodium 136 mmol/L      Potassium 3 7 mmol/L      Chloride 103 mmol/L      CO2 24 mmol/L      ANION GAP 9 mmol/L      BUN 11 mg/dL Creatinine 0 62 mg/dL      Glucose 79 mg/dL      Calcium 9 1 mg/dL      AST 12 U/L      ALT 9 U/L      Alkaline Phosphatase 70 U/L      Total Protein 7 2 g/dL      Albumin 4 1 g/dL      Total Bilirubin 0 31 mg/dL      eGFR 114 ml/min/1 73sq m     Narrative:      [facility] guidelines for Chronic Kidney Disease (CKD):     Stage 1 with normal or high GFR (GFR > 90 mL/min/1 73 square meters)    Stage 2 Mild CKD (GFR = 60-89 mL/min/1 73 square meters)    Stage 3A Moderate CKD (GFR = 45-59 mL/min/1 73 square meters)    Stage 3B Moderate CKD (GFR = 30-44 mL/min/1 73 square meters)    Stage 4 Severe CKD (GFR = 15-29 mL/min/1 73 square meters)    Stage 5 End Stage CKD (GFR <15 mL/min/1 73 square meters)  Note: GFR calculation is accurate only with a steady state creatinine    D-Dimer [[id number]]  (Normal) Collected: 06/27/22 1933    Lab Status: Final result Specimen: Blood from Arm, Left Updated: 06/27/22 1948     D-Dimer, Quant 0 38 ug/ml FEU     CBC and differential [[id number]] Collected: 06/27/22 1933    Lab Status: Final result Specimen: Blood from Arm, Left Updated: 06/27/22 1939     WBC 8 95 Thousand/uL      RBC 4 66 Million/uL      Hemoglobin 13 0 g/dL      Hematocrit 39 3 %      MCV 84 fL      MCH 27 9 pg      MCHC 33 1 g/dL      RDW 13 4 %      MPV 9 4 fL      Platelets 012 Thousands/uL      nRBC 0 /100 WBCs      Neutrophils Relative 63 %      Immat GRANS % 0 %      Lymphocytes Relative 28 %      Monocytes Relative 7 %      Eosinophils Relative 2 %      Basophils Relative 0 %      Neutrophils Absolute 5 63 Thousands/µL      Immature Grans Absolute 0 04 Thousand/uL      Lymphocytes Absolute 2 47 Thousands/µL      Monocytes Absolute 0 63 Thousand/µL      Eosinophils Absolute 0 14 Thousand/µL      Basophils Absolute 0 04 Thousands/µL                  CT spine cervical wo contrast   Final Result by Luz Marina Ross MD (06/27 2120)      No cervical spine fracture or traumatic malalignment  Follow-up with MRI of the cervical spine may be considered, if symptoms persist                    Workstation performed: [id number]                    Procedures  Procedures         ED Course  ED Course as of 06/27/22 2145 Mon Jun 27, 2022 2018 Labs thus far review with patient  Clinically she has improved but still reports some spasming in her neck and upper back with movement  Awaiting CT scan results  MDM  Number of Diagnoses or Management Options     Amount and/or Complexity of Data Reviewed  Clinical lab tests: ordered and reviewed  Tests in the radiology section of CPT®: ordered and reviewed  Review and summarize past medical records: yes    Risk of Complications, Morbidity, and/or Mortality  Presenting problems: moderate  Diagnostic procedures: moderate  Management options: moderate  General comments: Patient is a 73-year-old female presenting with signs and symptoms concerning for possible cervical thoracic radiculopathy and muscle spasm  Patient's symptoms markedly improved with ED interventions  Repeat examination still reveals some point tenderness involving the right non bony portions of her cervical spine and upper back  Her blood pressure was markedly elevated upon presentation which I believe was mostly due to her being in pain and uncomfortable  Repeat blood pressure his now within normal limits after pain is controlled  She has no hard neurological findings although she does report some occasional tingling of her fingers  Plan is to discharge patient to home with a Medrol Dosepak and anti-inflammatory medications as well  I have also instructed her that Salonpas patches may help with her musculoskeletal pain  She is encouraged to follow-up with orthopedics at the number provided and is also given strict return precautions      Patient Progress  Patient progress: improved      Disposition  Final diagnoses:   Radiculopathy of cervicothoracic region   Neck and shoulder pain     Time reflects when diagnosis was documented in both MDM as applicable and the Disposition within this note     Time User Action Codes Description Comment    6/27/2022  9:35 PM Shahid Juni Add [T81 77] Radiculopathy of cervicothoracic region     6/27/2022  9:36 PM Shahid Juni Add [M54 2,  M25 519] Neck and shoulder pain       ED Disposition     ED Disposition   Discharge    Condition   Stable    Date/Time   Mon Jun 27, 2022  9:35 PM    Comment   Grecia Stringer discharge to home/self care  Follow-up Information     Follow up With Specialties Details Why Contact Info Additional Information    Your primary doctor  Call in 2 days       [address] [facility] [place] [facility] Call [facility] day  [address] [facility] [address], [facility]          Patient's Medications   Discharge Prescriptions    METHYLPREDNISOLONE 4 MG TABLET THERAPY PACK    Use as directed on package       Start Date: 6/27/2022 End Date: --       Order Dose: --       Quantity: 21 tablet    Refills: 0    NAPROXEN (NAPROSYN) 500 MG TABLET    Take 1 tablet (500 mg total) by mouth 2 (two) times a day with meals       Start Date: 6/27/2022 End Date: --       Order Dose: 500 mg       Quantity: 30 tablet    Refills: 0       No discharge procedures on file      PDMP Review     None          ED Provider  Electronically Signed by           Neal Saldana MD  06/27/22 4211

## 2022-06-28 NOTE — DISCHARGE INSTRUCTIONS
Take all medications as directed  Follow-up with PCP and orthopedics as discussed  Return to the emergency department any time for any new worsening symptoms

## 2023-10-10 ENCOUNTER — APPOINTMENT (EMERGENCY)
Dept: ULTRASOUND IMAGING | Facility: HOSPITAL | Age: 38
End: 2023-10-10
Payer: COMMERCIAL

## 2023-10-10 ENCOUNTER — HOSPITAL ENCOUNTER (EMERGENCY)
Facility: HOSPITAL | Age: 38
Discharge: HOME | End: 2023-10-10
Attending: EMERGENCY MEDICINE | Admitting: EMERGENCY MEDICINE
Payer: COMMERCIAL

## 2023-10-10 VITALS
DIASTOLIC BLOOD PRESSURE: 81 MMHG | BODY MASS INDEX: 23.04 KG/M2 | OXYGEN SATURATION: 100 % | HEIGHT: 63 IN | HEART RATE: 116 BPM | TEMPERATURE: 98.2 F | WEIGHT: 130 LBS | RESPIRATION RATE: 18 BRPM | SYSTOLIC BLOOD PRESSURE: 169 MMHG

## 2023-10-10 DIAGNOSIS — R10.9 ABDOMINAL CRAMPING: Primary | ICD-10-CM

## 2023-10-10 DIAGNOSIS — N89.8 VAGINAL DISCHARGE: ICD-10-CM

## 2023-10-10 LAB
BILIRUB UR QL STRIP: NEGATIVE
CLARITY UR: CLEAR
COLOR UR: YELLOW
EXT PREGNANCY TEST URINE: NEGATIVE
EXT. CONTROL: NORMAL
GLUCOSE UR STRIP-MCNC: NEGATIVE MG/DL
HGB UR QL STRIP.AUTO: NEGATIVE
KETONES UR STRIP-MCNC: NEGATIVE MG/DL
LEUKOCYTE ESTERASE UR QL STRIP: NEGATIVE
NITRITE UR QL STRIP: NEGATIVE
PH UR STRIP.AUTO: 7 [PH]
PROT UR STRIP-MCNC: NEGATIVE MG/DL
SP GR UR STRIP.AUTO: 1.01 (ref 1–1.03)
UROBILINOGEN UR QL STRIP.AUTO: 0.2 E.U./DL

## 2023-10-10 PROCEDURE — 87591 N.GONORRHOEAE DNA AMP PROB: CPT

## 2023-10-10 PROCEDURE — 76830 TRANSVAGINAL US NON-OB: CPT

## 2023-10-10 PROCEDURE — 81514 NFCT DS BV&VAGINITIS DNA ALG: CPT

## 2023-10-10 PROCEDURE — 87491 CHLMYD TRACH DNA AMP PROBE: CPT

## 2023-10-10 PROCEDURE — 99284 EMERGENCY DEPT VISIT MOD MDM: CPT

## 2023-10-10 PROCEDURE — 81003 URINALYSIS AUTO W/O SCOPE: CPT

## 2023-10-10 PROCEDURE — 81025 URINE PREGNANCY TEST: CPT

## 2023-10-10 PROCEDURE — 99284 EMERGENCY DEPT VISIT MOD MDM: CPT | Performed by: EMERGENCY MEDICINE

## 2023-10-10 PROCEDURE — 76856 US EXAM PELVIC COMPLETE: CPT

## 2023-10-10 PROCEDURE — 96372 THER/PROPH/DIAG INJ SC/IM: CPT

## 2023-10-10 RX ORDER — METRONIDAZOLE 500 MG/1
500 TABLET ORAL EVERY 12 HOURS SCHEDULED
Qty: 13 TABLET | Refills: 0 | Status: SHIPPED | OUTPATIENT
Start: 2023-10-10 | End: 2023-10-17

## 2023-10-10 RX ORDER — METRONIDAZOLE 500 MG/1
500 TABLET ORAL ONCE
Status: COMPLETED | OUTPATIENT
Start: 2023-10-10 | End: 2023-10-10

## 2023-10-10 RX ORDER — DOXYCYCLINE HYCLATE 100 MG/1
100 CAPSULE ORAL 2 TIMES DAILY
Qty: 19 CAPSULE | Refills: 0 | Status: SHIPPED | OUTPATIENT
Start: 2023-10-10 | End: 2023-10-20

## 2023-10-10 RX ORDER — ACTIVATED CHARCOAL 208 MG/ML
50 SUSPENSION ORAL ONCE
Status: DISCONTINUED | OUTPATIENT
Start: 2023-10-10 | End: 2023-10-10

## 2023-10-10 RX ORDER — METRONIDAZOLE 500 MG/1
500 TABLET ORAL EVERY 12 HOURS SCHEDULED
Qty: 13 TABLET | Refills: 0 | Status: SHIPPED | OUTPATIENT
Start: 2023-10-10 | End: 2023-10-10 | Stop reason: SDUPTHER

## 2023-10-10 RX ORDER — DOXYCYCLINE HYCLATE 100 MG/1
100 CAPSULE ORAL 2 TIMES DAILY
Qty: 19 CAPSULE | Refills: 0 | Status: SHIPPED | OUTPATIENT
Start: 2023-10-10 | End: 2023-10-10 | Stop reason: SDUPTHER

## 2023-10-10 RX ORDER — DOXYCYCLINE HYCLATE 100 MG/1
100 CAPSULE ORAL ONCE
Status: COMPLETED | OUTPATIENT
Start: 2023-10-10 | End: 2023-10-10

## 2023-10-10 RX ADMIN — METRONIDAZOLE 500 MG: 500 TABLET ORAL at 13:55

## 2023-10-10 RX ADMIN — CEFTRIAXONE SODIUM 500 MG: 1 INJECTION, POWDER, FOR SOLUTION INTRAMUSCULAR; INTRAVENOUS at 13:56

## 2023-10-10 RX ADMIN — DOXYCYCLINE 100 MG: 100 CAPSULE ORAL at 13:55

## 2023-10-10 NOTE — ED ATTENDING ATTESTATION
10/10/2023  I, Sofya Castro MD, saw and evaluated the patient. I have discussed the patient with the resident/non-physician practitioner and agree with the resident's/non-physician practitioner's findings, Plan of Care, and MDM as documented in the resident's/non-physician practitioner's note, except where noted. All available labs and Radiology studies were reviewed. I was present for key portions of any procedure(s) performed by the resident/non-physician practitioner and I was immediately available to provide assistance. At this point I agree with the current assessment done in the Emergency Department. I have conducted an independent evaluation of this patient a history and physical is as follows:    ED Course  ED Course as of 10/10/23 1858   Tue Oct 10, 2023   1207 I independently and examined the patient. On my evaluation, patient reports intermittent lower abdominal pain x 1 week, although none right now. She reports her period is late, and she has had 3 days of thin white discharge. She has has unprotected sex with multiple partners, no history of PID. Physical exam:   Gen: No acute distress  Resp: Clear to auscultation without wheezing or stridor  Cardio: RRR, no m/r/g  Abd: Very mild LLQ tenderness described as bloating rather than pain, no rebound or guarding  Ext: No edema    Assessment/plan:  Vaginal discharge - will check urine and vaginal swab  Pelvic pain - pregnancy test, imaging. 1335 Patient declines CT. Will treat empirically for pelvic infection but return precautions discussed with patient. She is non-toxic, with non-acute abdominal exam at time of discharge.          Critical Care Time  Procedures

## 2023-10-10 NOTE — ED TRIAGE NOTES
Via [address] w/complaint of abdominal cramping x4 days; states one week late w/period, LMP 8 Sep 2023; home pregnancy test negative; denies N/V, diarrhea and/or fever; admits to white vaginal discharge; denies hypertension

## 2023-10-10 NOTE — DISCHARGE INSTRUCTIONS
Please return the emergency department if symptoms worsen. Please follow-up with your primary care physician to discuss the need for follow-up ultrasound. PELVIC ULTRASOUND, COMPLETE  IMPRESSION:  Otherwise normal-appearing endometrium measures 16 mm with mild internal vascularity, which may be within normal limits in this premenopausal female. Two small uterine fibroids, including a posterior uterine body submucosal fibroid. Left ovarian cyst measuring 3.8 cm containing peripheral nonvascular soft tissue and a single thin septation, possibly representing retractile clot within a hemorrhagic cyst, although mural nodularity/soft tissue is possible. Follow-up ultrasound in 8 to 12 weeks is recommended to ensure resolution.

## 2023-10-10 NOTE — ED NOTES
Patient transported to [address] [place] [address], [address] [place] [address]  10/10/23 7038

## 2023-10-10 NOTE — ED PROVIDER NOTES
History  Chief Complaint   Patient presents with   • Abdominal Cramping     [address]ia [address] w/complaint of abdominal cramping x4 days; states one week late w/period, LMP 8 Sep 2023; home pregnancy test negative; denies N/V, diarrhea and/or fever; admits to white vaginal discharge; denies hypertension     This is a 40-year-old woman with history of dyspareunia who presents for pelvic/abdominal cramping for the last 3 days. She states she is 1 week late with her period as her LMP was September 8. Her multiple home pregnancy tests are negative. She endorses having above baseline white vaginal discharge, not malodorous. Denies vaginal discomfort or itching. She has multiple sexual partners and does not consistently use protection. No history of PID. Prior to Admission Medications   Prescriptions Last Dose Informant Patient Reported? Taking?   acetaminophen (TYLENOL) 500 mg tablet   No No   Sig: Take 2 tablets (1,000 mg total) by mouth every 6 (six) hours as needed for mild pain for up to 20 doses   methocarbamol (ROBAXIN) 500 mg tablet   No No   Sig: Take 1 tablet (500 mg total) by mouth 2 (two) times a day   methylPREDNISolone 4 MG tablet therapy pack   No No   Sig: Use as directed on package   naproxen (Naprosyn) 250 mg tablet   No No   Sig: Take 1 tablet (250 mg total) by mouth 2 (two) times a day as needed for mild pain for up to 30 doses   naproxen (Naprosyn) 500 mg tablet   No No   Sig: Take 1 tablet (500 mg total) by mouth 2 (two) times a day with meals      Facility-Administered Medications: None       History reviewed. No pertinent past medical history. History reviewed. No pertinent surgical history. History reviewed. No pertinent family history. I have reviewed and agree with the history as documented.     E-Cigarette/Vaping   • E-Cigarette Use Never User      E-Cigarette/Vaping Substances   • Nicotine No    • THC No    • CBD No    • Flavoring No    • Other No    • Unknown No      Social History Tobacco Use   • Smoking status: Never   • Smokeless tobacco: Never   Vaping Use   • Vaping Use: Never used   Substance Use Topics   • Alcohol use: Yes   • Drug use: Never        Review of Systems   Constitutional: Positive for fatigue. Negative for chills and fever. HENT: Negative for congestion, ear pain, rhinorrhea and sore throat. Eyes: Negative for pain and visual disturbance. Respiratory: Negative for cough and shortness of breath. Cardiovascular: Negative for chest pain and palpitations. Gastrointestinal: Negative for abdominal pain, constipation, diarrhea, nausea and vomiting. Genitourinary: Positive for menstrual problem. Negative for dysuria, flank pain and hematuria. Musculoskeletal: Negative for arthralgias and back pain. Skin: Negative for color change and rash. Neurological: Positive for dizziness. Negative for seizures, weakness and headaches. All other systems reviewed and are negative. Physical Exam  ED Triage Vitals [10/10/23 1146]   Temperature Pulse Respirations Blood Pressure SpO2   98.2 °F (36.8 °C) (!) 116 18 (!) 167/111 100 %      Temp Source Heart Rate Source Patient Position - Orthostatic VS BP Location FiO2 (%)   Oral Monitor Sitting Left arm --      Pain Score       4             Orthostatic Vital Signs  Vitals:    10/10/23 1146 10/10/23 1152   BP: (!) 167/111 169/81   Pulse: (!) 116    Patient Position - Orthostatic VS: Sitting        Physical Exam  Vitals and nursing note reviewed. Constitutional:       General: She is not in acute distress. Appearance: She is well-developed. She is not ill-appearing or toxic-appearing. HENT:      Head: Normocephalic and atraumatic. Mouth/Throat:      Mouth: Mucous membranes are moist.   Eyes:      Conjunctiva/sclera: Conjunctivae normal.   Cardiovascular:      Rate and Rhythm: Regular rhythm. Tachycardia present. Heart sounds: Normal heart sounds. No murmur heard.   Pulmonary:      Effort: Pulmonary effort is normal. No respiratory distress. Breath sounds: Normal breath sounds. Abdominal:      General: Bowel sounds are normal. There is no distension. Palpations: Abdomen is soft. There is no mass. Tenderness: There is no abdominal tenderness. There is no right CVA tenderness, left CVA tenderness or guarding. Comments: Suprapubic discomfort, not pain, to moderate-deep palpation. Minimal tenderness in LLQ   Musculoskeletal:         General: No swelling. Cervical back: Neck supple. Right lower leg: No edema. Left lower leg: No edema. Skin:     General: Skin is warm and dry. Capillary Refill: Capillary refill takes less than 2 seconds. Neurological:      Mental Status: She is alert. Psychiatric:         Mood and Affect: Mood normal.         ED Medications  Medications   cefTRIAXone (ROCEPHIN) 500 mg in lidocaine (PF) (XYLOCAINE-MPF) 1 % IM only syringe (has no administration in time range)   doxycycline hyclate (VIBRAMYCIN) capsule 100 mg (100 mg Oral Given 10/10/23 1355)   metroNIDAZOLE (FLAGYL) tablet 500 mg (500 mg Oral Given 10/10/23 1355)       Diagnostic Studies  Results Reviewed     Procedure Component Value Units Date/Time    UA w Reflex to Microscopic w Reflex to Culture [[id number]]  (Normal) Collected: 10/10/23 1208    Lab Status: Final result Specimen: Urine, Clean Catch Updated: 10/10/23 1242     Color, UA Yellow     Clarity, UA Clear     Specific Gravity, UA 1.015     pH, UA 7.0     Leukocytes, UA Negative     Nitrite, UA Negative     Protein, UA Negative mg/dl      Glucose, UA Negative mg/dl      Ketones, UA Negative mg/dl      Urobilinogen, UA 0.2 E.U./dl      Bilirubin, UA Negative     Occult Blood, UA Negative    Chlamydia/GC amplified DNA by PCR [[id number]] Collected: 10/10/23 1208    Lab Status: In process Specimen: Urine, Other Updated: 10/10/23 1239    Molecular Vaginal Panel [[id number]] Collected: 10/10/23 1210    Lab Status:  In process Specimen: Genital from Vaginal Updated: 10/10/23 1235    POCT pregnancy, urine [[id number]]  (Normal) Resulted: 10/10/23 1215    Lab Status: Final result Updated: 10/10/23 1215     EXT Preg Test, Ur Negative     Control Valid                 US pelvis complete w transvaginal   Final Result by Elissa Mills MD (10/10 1320)      Otherwise normal-appearing endometrium measures 16 mm with mild internal vascularity, which may be within normal limits in this premenopausal female. Two small uterine fibroids, including a posterior uterine body submucosal fibroid. Left ovarian cyst measuring 3.8 cm containing peripheral nonvascular soft tissue and a single thin septation, possibly representing retractile clot within a hemorrhagic cyst, although mural nodularity/soft tissue is possible. Follow-up ultrasound in 8 to    12 weeks is recommended to ensure resolution. The study was marked in [facility] for immediate notification. Workstation performed: [id number]               Procedures  Procedures      ED Course  ED Course as of 10/10/23 1359   Tue Oct 10, 2023   1203 We will check urine pregnancy, GC chlamydia, UA, and molecular vaginal panel. Pending urine pregnancy test, we will decide on pelvic ultrasound   1219 PREGNANCY TEST URINE: Negative   1243 Leukocytes, UA: Negative   1243 Nitrite, UA: Negative   1243 Blood, UA: Negative   1321 Otherwise normal-appearing endometrium measures 16 mm with mild internal vascularity, which may be within normal limits in this premenopausal female.     Two small uterine fibroids, including a posterior uterine body submucosal fibroid.     Left ovarian cyst measuring 3.8 cm containing peripheral nonvascular soft tissue and a single thin septation, possibly representing retractile clot within a hemorrhagic cyst, although mural nodularity/soft tissue is possible. Follow-up ultrasound in 8 to   12 weeks is recommended to ensure resolution.    1330 Discussed the findings of the ultrasound with the patient. She does not want further imaging. She is agreeable to antibiotic therapy in light of not having test results of GC/BV/trichomoniasis. SBIRT 20yo+    Flowsheet Row Most Recent Value   Initial Alcohol Screen: US AUDIT-C     1. How often do you have a drink containing alcohol? 0 Filed at: 10/10/2023 1148   2. How many drinks containing alcohol do you have on a typical day you are drinking? 0 Filed at: 10/10/2023 1148   3b. FEMALE Any Age, or MALE 65+: How often do you have 4 or more drinks on one occassion? 0 Filed at: 10/10/2023 1148   Audit-C Score 0 Filed at: 10/10/2023 1148   SATISH: How many times in the past year have you. .. Used an illegal drug or used a prescription medication for non-medical reasons? Never Filed at: 10/10/2023 1148                Medical Decision Making  This is a 43-year-old female with history of dyspareunia who presents for pelvic cramping. She currently denies any kind of abdominal pain currently. Her exam is reassuring as there is no abdominal tenderness, only suprapubic discomfort upon moderate to deep palpation. Based on her history, appears more consistent with gynecological versus urinary tract issue. She appears clinically well and we will hold off on ordering further lab work at this time besides UA, urine pregnancy, GC, molecular vaginal panel. Urinalysis is bland. Pregnancy test negative. GC and MVP are pending. Pelvic ultrasound with left ovarian cyst that will need follow-up in 8 to 12 weeks. Endometrial lining thickness of 16 mm, likely normal in a premenopausal woman. Given that pelvic infection labs are pending, we will opt to treat. Patient did not want further CT imaging. She is agreeable with follow-up as well as understands why we are starting antibiotics today.     Abdominal cramping: acute illness or injury  Vaginal discharge: acute illness or injury  Amount and/or Complexity of Data Reviewed  Labs: ordered. Disposition  Final diagnoses:   Abdominal cramping   Vaginal discharge     Time reflects when diagnosis was documented in both MDM as applicable and the Disposition within this note     Time User Action Codes Description Comment    10/10/2023  1:39 PM Sabrina Red Add [R10.9] Abdominal cramping     10/10/2023  1:39 PM Sabrina Red Add [N89.8] Vaginal discharge       ED Disposition     ED Disposition   Discharge    Condition   Stable    Date/Time   Tue Oct 10, 2023  1:44 PM    Comment   Ziyad Ward discharge to home/self care. Follow-up Information     Follow up With Specialties Details Why Contact Info    Sangeetha Silva MD Internal Medicine Schedule an appointment as soon as possible for a visit   [address], [facility]  [address] [facility]      Yesy Chisholm MD Internal Medicine Schedule an appointment as soon as possible for a visit   [address]  [address] [address]  [phone]            Patient's Medications   Discharge Prescriptions    DOXYCYCLINE HYCLATE (VIBRAMYCIN) 100 MG CAPSULE    Take 1 capsule (100 mg total) by mouth 2 (two) times a day for 10 days       Start Date: 10/10/2023End Date: 10/20/2023       Order Dose: 100 mg       Quantity: 19 capsule    Refills: 0    METRONIDAZOLE (FLAGYL) 500 MG TABLET    Take 1 tablet (500 mg total) by mouth every 12 (twelve) hours for 7 days       Start Date: 10/10/2023End Date: 10/17/2023       Order Dose: 500 mg       Quantity: 13 tablet    Refills: 0     No discharge procedures on file. PDMP Review     None           ED Provider  Attending physically available and evaluated Ziyad Bernalann. I managed the patient along with the ED Attending.     Electronically Signed by         Sabrina Red MD  10/10/23 4804

## 2023-10-11 LAB
C GLABRATA DNA VAG QL NAA+PROBE: NEGATIVE
C KRUSEI DNA VAG QL NAA+PROBE: NEGATIVE
C TRACH DNA SPEC QL NAA+PROBE: NEGATIVE
CANDIDA SP 6 PNL VAG NAA+PROBE: NEGATIVE
N GONORRHOEA DNA SPEC QL NAA+PROBE: NEGATIVE
T VAGINALIS DNA VAG QL NAA+PROBE: NEGATIVE
VAGINOSIS/ITIS DNA PNL VAG PROBE+SIG AMP: POSITIVE

## 2024-01-08 ENCOUNTER — APPOINTMENT (EMERGENCY)
Dept: ULTRASOUND IMAGING | Facility: HOSPITAL | Age: 39
End: 2024-01-08
Payer: COMMERCIAL

## 2024-01-08 ENCOUNTER — HOSPITAL ENCOUNTER (EMERGENCY)
Facility: HOSPITAL | Age: 39
Discharge: HOME | End: 2024-01-08
Attending: INTERNAL MEDICINE
Payer: COMMERCIAL

## 2024-01-08 VITALS
TEMPERATURE: 98.7 F | OXYGEN SATURATION: 100 % | RESPIRATION RATE: 16 BRPM | DIASTOLIC BLOOD PRESSURE: 94 MMHG | SYSTOLIC BLOOD PRESSURE: 146 MMHG | BODY MASS INDEX: 23.87 KG/M2 | WEIGHT: 134.7 LBS | HEART RATE: 90 BPM | HEIGHT: 63 IN

## 2024-01-08 DIAGNOSIS — O26.891 ABDOMINAL PAIN IN PREGNANCY, FIRST TRIMESTER: Primary | ICD-10-CM

## 2024-01-08 DIAGNOSIS — R10.9 ABDOMINAL PAIN IN PREGNANCY, FIRST TRIMESTER: Primary | ICD-10-CM

## 2024-01-08 LAB
ANION GAP SERPL CALCULATED.3IONS-SCNC: 8 MMOL/L
B-HCG SERPL-ACNC: ABNORMAL MIU/ML (ref 0–5)
BASOPHILS # BLD AUTO: 0.06 THOUSANDS/ÂΜL (ref 0–0.1)
BASOPHILS NFR BLD AUTO: 1 % (ref 0–1)
BILIRUB UR QL STRIP: NEGATIVE
BUN SERPL-MCNC: 12 MG/DL (ref 5–25)
CALCIUM SERPL-MCNC: 9.6 MG/DL (ref 8.4–10.2)
CHLORIDE SERPL-SCNC: 104 MMOL/L (ref 96–108)
CLARITY UR: CLEAR
CO2 SERPL-SCNC: 24 MMOL/L (ref 21–32)
COLOR UR: YELLOW
CREAT SERPL-MCNC: 0.69 MG/DL (ref 0.6–1.3)
EOSINOPHIL # BLD AUTO: 0.03 THOUSAND/ÂΜL (ref 0–0.61)
EOSINOPHIL NFR BLD AUTO: 0 % (ref 0–6)
ERYTHROCYTE [DISTWIDTH] IN BLOOD BY AUTOMATED COUNT: 14.6 % (ref 11.6–15.1)
EXT PREGNANCY TEST URINE: POSITIVE
EXT. CONTROL: ABNORMAL
GFR SERPL CREATININE-BSD FRML MDRD: 109 ML/MIN/1.73SQ M
GLUCOSE SERPL-MCNC: 86 MG/DL (ref 65–140)
GLUCOSE UR STRIP-MCNC: NEGATIVE MG/DL
HCT VFR BLD AUTO: 35.6 % (ref 34.8–46.1)
HGB BLD-MCNC: 11.9 G/DL (ref 11.5–15.4)
HGB UR QL STRIP.AUTO: NEGATIVE
IMM GRANULOCYTES # BLD AUTO: 0.03 THOUSAND/UL (ref 0–0.2)
IMM GRANULOCYTES NFR BLD AUTO: 0 % (ref 0–2)
KETONES UR STRIP-MCNC: NEGATIVE MG/DL
LEUKOCYTE ESTERASE UR QL STRIP: NEGATIVE
LYMPHOCYTES # BLD AUTO: 1.52 THOUSANDS/ÂΜL (ref 0.6–4.47)
LYMPHOCYTES NFR BLD AUTO: 17 % (ref 14–44)
MCH RBC QN AUTO: 27.5 PG (ref 26.8–34.3)
MCHC RBC AUTO-ENTMCNC: 33.4 G/DL (ref 31.4–37.4)
MCV RBC AUTO: 82 FL (ref 82–98)
MONOCYTES # BLD AUTO: 0.56 THOUSAND/ÂΜL (ref 0.17–1.22)
MONOCYTES NFR BLD AUTO: 6 % (ref 4–12)
NEUTROPHILS # BLD AUTO: 6.82 THOUSANDS/ÂΜL (ref 1.85–7.62)
NEUTS SEG NFR BLD AUTO: 76 % (ref 43–75)
NITRITE UR QL STRIP: NEGATIVE
NRBC BLD AUTO-RTO: 0 /100 WBCS
PH UR STRIP.AUTO: 5.5 [PH]
PLATELET # BLD AUTO: 369 THOUSANDS/UL (ref 149–390)
PMV BLD AUTO: 9.8 FL (ref 8.9–12.7)
POTASSIUM SERPL-SCNC: 3.4 MMOL/L (ref 3.5–5.3)
PROT UR STRIP-MCNC: NEGATIVE MG/DL
RBC # BLD AUTO: 4.33 MILLION/UL (ref 3.81–5.12)
SODIUM SERPL-SCNC: 136 MMOL/L (ref 135–147)
SP GR UR STRIP.AUTO: 1.02 (ref 1–1.03)
UROBILINOGEN UR QL STRIP.AUTO: 0.2 E.U./DL
WBC # BLD AUTO: 9.02 THOUSAND/UL (ref 4.31–10.16)

## 2024-01-08 PROCEDURE — 76815 OB US LIMITED FETUS(S): CPT

## 2024-01-08 PROCEDURE — 96361 HYDRATE IV INFUSION ADD-ON: CPT

## 2024-01-08 PROCEDURE — 36415 COLL VENOUS BLD VENIPUNCTURE: CPT | Performed by: PHYSICIAN ASSISTANT

## 2024-01-08 PROCEDURE — 85025 COMPLETE CBC W/AUTO DIFF WBC: CPT | Performed by: PHYSICIAN ASSISTANT

## 2024-01-08 PROCEDURE — 93005 ELECTROCARDIOGRAM TRACING: CPT

## 2024-01-08 PROCEDURE — 80048 BASIC METABOLIC PNL TOTAL CA: CPT | Performed by: PHYSICIAN ASSISTANT

## 2024-01-08 PROCEDURE — 99284 EMERGENCY DEPT VISIT MOD MDM: CPT

## 2024-01-08 PROCEDURE — 81003 URINALYSIS AUTO W/O SCOPE: CPT | Performed by: PHYSICIAN ASSISTANT

## 2024-01-08 PROCEDURE — 99285 EMERGENCY DEPT VISIT HI MDM: CPT | Performed by: PHYSICIAN ASSISTANT

## 2024-01-08 PROCEDURE — 96360 HYDRATION IV INFUSION INIT: CPT

## 2024-01-08 PROCEDURE — 84702 CHORIONIC GONADOTROPIN TEST: CPT | Performed by: PHYSICIAN ASSISTANT

## 2024-01-08 PROCEDURE — 81025 URINE PREGNANCY TEST: CPT | Performed by: PHYSICIAN ASSISTANT

## 2024-01-08 RX ADMIN — SODIUM CHLORIDE 1000 ML: 0.9 INJECTION, SOLUTION INTRAVENOUS at 16:29

## 2024-01-08 NOTE — DISCHARGE INSTRUCTIONS
Pelvic rest  Follow-up with OB/GYN within 1 week for repeat beta quant  Your beta quant was: [id number] today

## 2024-01-08 NOTE — ED PROVIDER NOTES
History  Chief Complaint   Patient presents with    Abdominal Cramping     LMP , (+) home pregnancy test, abdominal cramping today, denies abnormal discharge/bleeding,      PMH : Myoma uterus  NO PSH    (1 SAB) - currently pregnant based on + home preg test  LMP: 2023    Pt presents to ED c/o 2 day h/o, diffuse, intermittent,  lower, pinching, cramping abd pain, breast tenderness, denies fever, cp, sob, NVD, urinary complaints abnormal vag bleeding/dc, that feels similar to prior SAB.  Took home preg test which was +,  no prenatal care to date, scheduled apt online, unsure OB name            Prior to Admission Medications   Prescriptions Last Dose Informant Patient Reported? Taking?   acetaminophen (TYLENOL) 500 mg tablet   No No   Sig: Take 2 tablets (1,000 mg total) by mouth every 6 (six) hours as needed for mild pain for up to 20 doses   methocarbamol (ROBAXIN) 500 mg tablet   No No   Sig: Take 1 tablet (500 mg total) by mouth 2 (two) times a day   methylPREDNISolone 4 MG tablet therapy pack   No No   Sig: Use as directed on package   naproxen (Naprosyn) 250 mg tablet   No No   Sig: Take 1 tablet (250 mg total) by mouth 2 (two) times a day as needed for mild pain for up to 30 doses   naproxen (Naprosyn) 500 mg tablet   No No   Sig: Take 1 tablet (500 mg total) by mouth 2 (two) times a day with meals      Facility-Administered Medications: None       History reviewed. No pertinent past medical history.    History reviewed. No pertinent surgical history.    History reviewed. No pertinent family history.  I have reviewed and agree with the history as documented.    E-Cigarette/Vaping    E-Cigarette Use Never User      E-Cigarette/Vaping Substances    Nicotine No     THC No     CBD No     Flavoring No     Other No     Unknown No      Social History     Tobacco Use    Smoking status: Some Days     Types: Cigarettes    Smokeless tobacco: Never   Vaping Use    Vaping status: Never Used   Substance  Use Topics    Alcohol use: Yes    Drug use: Never       Review of Systems   Constitutional:  Negative for fever.   HENT:  Negative for sore throat.    Respiratory:  Negative for cough and shortness of breath.    Cardiovascular:  Negative for chest pain.   Gastrointestinal:  Positive for abdominal pain. Negative for nausea and vomiting.   Genitourinary:  Negative for dysuria, frequency, menstrual problem, vaginal bleeding and vaginal discharge.   Musculoskeletal:  Negative for arthralgias and myalgias.   Skin:  Negative for color change and pallor.   Neurological:  Negative for dizziness and weakness.   Psychiatric/Behavioral:  Negative for behavioral problems.    All other systems reviewed and are negative.      Physical Exam  Physical Exam  Vitals and nursing note reviewed.   Constitutional:       General: She is not in acute distress.     Appearance: She is well-developed.   HENT:      Head: Normocephalic and atraumatic.      Nose: Nose normal.      Mouth/Throat:      Mouth: Mucous membranes are moist.      Pharynx: Oropharynx is clear.   Eyes:      Conjunctiva/sclera: Conjunctivae normal.   Cardiovascular:      Rate and Rhythm: Normal rate.   Pulmonary:      Effort: Pulmonary effort is normal. No respiratory distress.   Abdominal:      General: Bowel sounds are normal.      Palpations: Abdomen is soft.      Tenderness: There is no abdominal tenderness. There is no guarding or rebound.   Genitourinary:     Comments: deferred  Musculoskeletal:         General: Normal range of motion.      Cervical back: Normal range of motion.   Skin:     General: Skin is warm and dry.   Neurological:      General: No focal deficit present.      Mental Status: She is alert.      Motor: No weakness.      Gait: Gait normal.   Psychiatric:         Behavior: Behavior normal.         Vital Signs  ED Triage Vitals [01/08/24 1535]   Temperature Pulse Respirations Blood Pressure SpO2   98.7 °F (37.1 °C) 90 16 146/94 100 %      Temp Source  Heart Rate Source Patient Position - Orthostatic VS BP Location FiO2 (%)   Oral Monitor Sitting Left arm --      Pain Score       No Pain           Vitals:    01/08/24 1535   BP: 146/94   Pulse: 90   Patient Position - Orthostatic VS: Sitting         Visual Acuity      ED Medications  Medications   sodium chloride 0.9 % bolus 1,000 mL (0 mL Intravenous Stopped 1/8/24 1842)       Diagnostic Studies  Results Reviewed       Procedure Component Value Units Date/Time    Quantitative hCG [[id number]]  (Abnormal) Collected: 01/08/24 1629    Lab Status: Final result Specimen: Blood from Arm, Left Updated: 01/08/24 1723     HCG, Quant 13,952 mIU/mL     Narrative:       Expected Ranges:    HCG results between 5 and 25 mIU/mL may be indicative of early pregnancy but should be interpreted in light of the total clinical presentation.    HCG can rise to detectable levels in bob and post menopausal women (0-11.6 mIU/mL).     Approximate               Approximate HCG  Gestation age          Concentration ( mIU/mL)  _____________          ______________________   Weeks                      HCG values  0.2-1                       5-50  1-2                           2-3                         100-5000  3-4                         500-31343  4-5                         1000-33204  5-6                         55948-101424  6-8                         60040-053885  8-12                        26861-093771      Basic metabolic panel [[id number]]  (Abnormal) Collected: 01/08/24 1629    Lab Status: Final result Specimen: Blood from Arm, Left Updated: 01/08/24 1650     Sodium 136 mmol/L      Potassium 3.4 mmol/L      Chloride 104 mmol/L      CO2 24 mmol/L      ANION GAP 8 mmol/L      BUN 12 mg/dL      Creatinine 0.69 mg/dL      Glucose 86 mg/dL      Calcium 9.6 mg/dL      eGFR 109 ml/min/1.73sq m     Narrative:      National Kidney Disease Foundation guidelines for Chronic Kidney Disease (CKD):     Stage 1 with normal or high GFR (GFR  > 90 mL/min/1.73 square meters)    Stage 2 Mild CKD (GFR = 60-89 mL/min/1.73 square meters)    Stage 3A Moderate CKD (GFR = 45-59 mL/min/1.73 square meters)    Stage 3B Moderate CKD (GFR = 30-44 mL/min/1.73 square meters)    Stage 4 Severe CKD (GFR = 15-29 mL/min/1.73 square meters)    Stage 5 End Stage CKD (GFR <15 mL/min/1.73 square meters)  Note: GFR calculation is accurate only with a steady state creatinine    CBC and differential [[id number]]  (Abnormal) Collected: 01/08/24 1629    Lab Status: Final result Specimen: Blood from Arm, Left Updated: 01/08/24 1636     WBC 9.02 Thousand/uL      RBC 4.33 Million/uL      Hemoglobin 11.9 g/dL      Hematocrit 35.6 %      MCV 82 fL      MCH 27.5 pg      MCHC 33.4 g/dL      RDW 14.6 %      MPV 9.8 fL      Platelets 369 Thousands/uL      nRBC 0 /100 WBCs      Neutrophils Relative 76 %      Immat GRANS % 0 %      Lymphocytes Relative 17 %      Monocytes Relative 6 %      Eosinophils Relative 0 %      Basophils Relative 1 %      Neutrophils Absolute 6.82 Thousands/µL      Immature Grans Absolute 0.03 Thousand/uL      Lymphocytes Absolute 1.52 Thousands/µL      Monocytes Absolute 0.56 Thousand/µL      Eosinophils Absolute 0.03 Thousand/µL      Basophils Absolute 0.06 Thousands/µL     UA w Reflex to Microscopic w Reflex to Culture [[id number]]  (Normal) Collected: 01/08/24 1558    Lab Status: Final result Specimen: Urine, Clean Catch Updated: 01/08/24 1621     Color, UA Yellow     Clarity, UA Clear     Specific Gravity, UA 1.025     pH, UA 5.5     Leukocytes, UA Negative     Nitrite, UA Negative     Protein, UA Negative mg/dl      Glucose, UA Negative mg/dl      Ketones, UA Negative mg/dl      Urobilinogen, UA 0.2 E.U./dl      Bilirubin, UA Negative     Occult Blood, UA Negative    POCT pregnancy, urine [[id number]]  (Abnormal) Resulted: 01/08/24 1600    Lab Status: Final result Updated: 01/08/24 1600     EXT Preg Test, Ur Positive     Control Valid                   US OB  pregnancy limited with transvaginal   Final Result by Jeremy Benton DO (01/08 1757)      1. Evidence of intrauterine gestation with gestational sac but no yolk sac or fetal pole at this time. Based on current consensus literature terminology, this is a intrauterine pregnancy of uncertain viability. Consider short-term follow-up ultrasound in    10 to 14 days if relevant to ensure normal progression of pregnancy.      2. No adnexal masses or free fluid.      3. Negative for ovarian torsion.      Reference: N Engl J Med 369;15 pp. 1443 to 1451.            Workstation performed: [id number]                    Procedures  Procedures         ED Course  ED Course as of 01/08/24 1900   Mon Jan 08, 2024   1608 PREGNANCY TEST URINE(!): Positive   1632 UA does not show any UTI   1637 CBC unremarkable   1701 Bmp unremarkable                               SBIRT 22yo+      Flowsheet Row Most Recent Value   Initial Alcohol Screen: US AUDIT-C     1. How often do you have a drink containing alcohol? 0 Filed at: 01/08/2024 1844   2. How many drinks containing alcohol do you have on a typical day you are drinking?  0 Filed at: 01/08/2024 1844   3a. Male UNDER 65: How often do you have five or more drinks on one occasion? 0 Filed at: 01/08/2024 1844   3b. FEMALE Any Age, or MALE 65+: How often do you have 4 or more drinks on one occassion? 0 Filed at: 01/08/2024 1844   Audit-C Score 0 Filed at: 01/08/2024 1844   SATISH: How many times in the past year have you...    Used an illegal drug or used a prescription medication for non-medical reasons? Never Filed at: 01/08/2024 1844                      Medical Decision Making  Patient with abdominal pain in early pregnancy.  Consider: Ectopic, ovarian torsion, IUP, early pregnancy, UTI,, IBS, colitis, among others  Labs are reassuring, beta quant is over 13,000 ultrasound shows intrauterine gestational sac but unable to identify anything further.  Discussed results with patient and  recommend following up  with repeat beta quant with OB, return to ED as needed    Amount and/or Complexity of Data Reviewed  External Data Reviewed: labs.     Details: A+ blood type based on old records  Labs: ordered. Decision-making details documented in ED Course.  Radiology: ordered.  ECG/medicine tests:      Details: EKG mistakenly ordered by tech, no indication from my exam, no cp, but EKG NSR @ 69, no acute ischemia    Risk  OTC drugs.             Disposition  Final diagnoses:   Abdominal pain in pregnancy, first trimester     Time reflects when diagnosis was documented in both MDM as applicable and the Disposition within this note       Time User Action Codes Description Comment    1/8/2024  6:26 PM Teresa Angela Add [O26.891,  R10.9] Abdominal pain in pregnancy, first trimester           ED Disposition       ED Disposition   Discharge    Condition   Stable    Date/Time   Mon Jan 8, 2024 1825    Comment   Zahra Herman discharge to home/self care.                   Follow-up Information       Follow up With Specialties Details Why Contact Info    Hussain Healy MD Obstetrics and Gynecology, Obstetrics, Gynecology   [address] [address]  [phone]              Discharge Medication List as of 1/8/2024  6:27 PM        CONTINUE these medications which have NOT CHANGED    Details   acetaminophen (TYLENOL) 500 mg tablet Take 2 tablets (1,000 mg total) by mouth every 6 (six) hours as needed for mild pain for up to 20 doses, Starting Sun 6/19/2022, Normal      methocarbamol (ROBAXIN) 500 mg tablet Take 1 tablet (500 mg total) by mouth 2 (two) times a day, Starting Sun 6/19/2022, Normal      methylPREDNISolone 4 MG tablet therapy pack Use as directed on package, Normal      !! naproxen (Naprosyn) 250 mg tablet Take 1 tablet (250 mg total) by mouth 2 (two) times a day as needed for mild pain for up to 30 doses, Starting Sun 6/19/2022, Normal      !! naproxen (Naprosyn) 500 mg tablet  Take 1 tablet (500 mg total) by mouth 2 (two) times a day with meals, Starting Mon 6/27/2022, Normal       !! - Potential duplicate medications found. Please discuss with provider.          No discharge procedures on file.    PDMP Review       None            ED Provider  Electronically Signed by             Teresa Angela PA-C  01/08/24 3883

## 2024-01-09 LAB
ATRIAL RATE: 69 BPM
P AXIS: 55 DEGREES
PR INTERVAL: 142 MS
QRS AXIS: 72 DEGREES
QRSD INTERVAL: 88 MS
QT INTERVAL: 374 MS
QTC INTERVAL: 400 MS
T WAVE AXIS: 64 DEGREES
VENTRICULAR RATE: 69 BPM

## 2024-01-15 ENCOUNTER — HOSPITAL ENCOUNTER (EMERGENCY)
Facility: HOSPITAL | Age: 39
Discharge: HOME | End: 2024-01-15
Attending: EMERGENCY MEDICINE
Payer: COMMERCIAL

## 2024-01-15 ENCOUNTER — APPOINTMENT (EMERGENCY)
Dept: ULTRASOUND IMAGING | Facility: HOSPITAL | Age: 39
End: 2024-01-15
Payer: COMMERCIAL

## 2024-01-15 VITALS
RESPIRATION RATE: 17 BRPM | HEART RATE: 94 BPM | SYSTOLIC BLOOD PRESSURE: 139 MMHG | TEMPERATURE: 98.4 F | OXYGEN SATURATION: 99 % | DIASTOLIC BLOOD PRESSURE: 74 MMHG

## 2024-01-15 DIAGNOSIS — O20.9 VAGINAL BLEEDING AFFECTING EARLY PREGNANCY: Primary | ICD-10-CM

## 2024-01-15 DIAGNOSIS — R10.2 PELVIC CRAMPING: ICD-10-CM

## 2024-01-15 LAB
ABO GROUP BLD: NORMAL
ABO GROUP BLD: NORMAL
ANION GAP SERPL CALCULATED.3IONS-SCNC: 8 MMOL/L
B-HCG SERPL-ACNC: ABNORMAL MIU/ML (ref 0–5)
BASOPHILS # BLD AUTO: 0.05 THOUSANDS/ÂΜL (ref 0–0.1)
BASOPHILS NFR BLD AUTO: 1 % (ref 0–1)
BILIRUB UR QL STRIP: NEGATIVE
BLD GP AB SCN SERPL QL: NEGATIVE
BUN SERPL-MCNC: 10 MG/DL (ref 5–25)
CALCIUM SERPL-MCNC: 9 MG/DL (ref 8.4–10.2)
CHLORIDE SERPL-SCNC: 105 MMOL/L (ref 96–108)
CLARITY UR: CLEAR
CO2 SERPL-SCNC: 22 MMOL/L (ref 21–32)
COLOR UR: YELLOW
CREAT SERPL-MCNC: 0.7 MG/DL (ref 0.6–1.3)
EOSINOPHIL # BLD AUTO: 0.08 THOUSAND/ÂΜL (ref 0–0.61)
EOSINOPHIL NFR BLD AUTO: 1 % (ref 0–6)
ERYTHROCYTE [DISTWIDTH] IN BLOOD BY AUTOMATED COUNT: 14.5 % (ref 11.6–15.1)
GFR SERPL CREATININE-BSD FRML MDRD: 108 ML/MIN/1.73SQ M
GLUCOSE SERPL-MCNC: 126 MG/DL (ref 65–140)
GLUCOSE UR STRIP-MCNC: NEGATIVE MG/DL
HCT VFR BLD AUTO: 35.8 % (ref 34.8–46.1)
HGB BLD-MCNC: 11.9 G/DL (ref 11.5–15.4)
HGB UR QL STRIP.AUTO: NEGATIVE
IMM GRANULOCYTES # BLD AUTO: 0.03 THOUSAND/UL (ref 0–0.2)
IMM GRANULOCYTES NFR BLD AUTO: 0 % (ref 0–2)
KETONES UR STRIP-MCNC: NEGATIVE MG/DL
LEUKOCYTE ESTERASE UR QL STRIP: NEGATIVE
LYMPHOCYTES # BLD AUTO: 1.31 THOUSANDS/ÂΜL (ref 0.6–4.47)
LYMPHOCYTES NFR BLD AUTO: 17 % (ref 14–44)
MCH RBC QN AUTO: 27.4 PG (ref 26.8–34.3)
MCHC RBC AUTO-ENTMCNC: 33.2 G/DL (ref 31.4–37.4)
MCV RBC AUTO: 82 FL (ref 82–98)
MONOCYTES # BLD AUTO: 0.42 THOUSAND/ÂΜL (ref 0.17–1.22)
MONOCYTES NFR BLD AUTO: 5 % (ref 4–12)
NEUTROPHILS # BLD AUTO: 5.97 THOUSANDS/ÂΜL (ref 1.85–7.62)
NEUTS SEG NFR BLD AUTO: 76 % (ref 43–75)
NITRITE UR QL STRIP: NEGATIVE
NRBC BLD AUTO-RTO: 0 /100 WBCS
PH UR STRIP.AUTO: 6 [PH]
PLATELET # BLD AUTO: 324 THOUSANDS/UL (ref 149–390)
PMV BLD AUTO: 9.6 FL (ref 8.9–12.7)
POTASSIUM SERPL-SCNC: 3.3 MMOL/L (ref 3.5–5.3)
PROT UR STRIP-MCNC: NEGATIVE MG/DL
RBC # BLD AUTO: 4.35 MILLION/UL (ref 3.81–5.12)
RH BLD: POSITIVE
RH BLD: POSITIVE
SODIUM SERPL-SCNC: 135 MMOL/L (ref 135–147)
SP GR UR STRIP.AUTO: 1.01 (ref 1–1.03)
SPECIMEN EXPIRATION DATE: NORMAL
UROBILINOGEN UR QL STRIP.AUTO: 0.2 E.U./DL
WBC # BLD AUTO: 7.86 THOUSAND/UL (ref 4.31–10.16)

## 2024-01-15 PROCEDURE — 99284 EMERGENCY DEPT VISIT MOD MDM: CPT | Performed by: EMERGENCY MEDICINE

## 2024-01-15 PROCEDURE — 86850 RBC ANTIBODY SCREEN: CPT | Performed by: EMERGENCY MEDICINE

## 2024-01-15 PROCEDURE — 84702 CHORIONIC GONADOTROPIN TEST: CPT | Performed by: EMERGENCY MEDICINE

## 2024-01-15 PROCEDURE — 85025 COMPLETE CBC W/AUTO DIFF WBC: CPT | Performed by: EMERGENCY MEDICINE

## 2024-01-15 PROCEDURE — 36415 COLL VENOUS BLD VENIPUNCTURE: CPT | Performed by: EMERGENCY MEDICINE

## 2024-01-15 PROCEDURE — 80048 BASIC METABOLIC PNL TOTAL CA: CPT | Performed by: EMERGENCY MEDICINE

## 2024-01-15 PROCEDURE — 86900 BLOOD TYPING SEROLOGIC ABO: CPT | Performed by: EMERGENCY MEDICINE

## 2024-01-15 PROCEDURE — 76801 OB US < 14 WKS SINGLE FETUS: CPT

## 2024-01-15 PROCEDURE — 99284 EMERGENCY DEPT VISIT MOD MDM: CPT

## 2024-01-15 PROCEDURE — 86901 BLOOD TYPING SEROLOGIC RH(D): CPT | Performed by: EMERGENCY MEDICINE

## 2024-01-15 PROCEDURE — 81003 URINALYSIS AUTO W/O SCOPE: CPT | Performed by: EMERGENCY MEDICINE

## 2024-01-15 RX ORDER — POTASSIUM CHLORIDE 20 MEQ/1
20 TABLET, EXTENDED RELEASE ORAL ONCE
Status: COMPLETED | OUTPATIENT
Start: 2024-01-15 | End: 2024-01-15

## 2024-01-15 RX ADMIN — POTASSIUM CHLORIDE 20 MEQ: 1500 TABLET, EXTENDED RELEASE ORAL at 11:43

## 2024-01-15 NOTE — ED PROVIDER NOTES
History  Chief Complaint   Patient presents with    Vaginal Bleeding - Pregnant     Patient about 6weeks pregnant. Has cramping and spotting. Has apt with ob/gyn on 1/22     38-year-old female G5, P3 presenting with abdominal cramping and vaginal bleeding.  She believes she is approximately 6 weeks pregnant.  Review of records reveals that she was evaluated in the emergency department a week ago.  Noted to be pregnant.  Pelvic ultrasound did not demonstrate an IUP at that point.  Plan for repeat in 7 to 10 days.    Scheduled to see her OB/GYN in 1 week.    She reports that last evening she started with vaginal spotting (blood on her toilet paper when she wipes). None today.  Denies dysuria, hematuria, increased frequency of urination.    Since yesterday evening she notes mild pelvic cramping across the lower pelvis suprapubically as well as bilaterally.  Nothing makes it better or worse.  She is unsure of her blood type.      Vaginal Bleeding  Abdominal Cramping  Pain location:  Suprapubic (L Lower pelvis, R lower pelvis)  Pain radiates to:  Does not radiate  Pain severity:  Mild  Onset quality:  Sudden  Duration:  1 day  Timing:  Constant  Associated symptoms: vaginal bleeding    Risk factors: pregnancy        Prior to Admission Medications   Prescriptions Last Dose Informant Patient Reported? Taking?   acetaminophen (TYLENOL) 500 mg tablet   No No   Sig: Take 2 tablets (1,000 mg total) by mouth every 6 (six) hours as needed for mild pain for up to 20 doses   methocarbamol (ROBAXIN) 500 mg tablet   No No   Sig: Take 1 tablet (500 mg total) by mouth 2 (two) times a day   methylPREDNISolone 4 MG tablet therapy pack   No No   Sig: Use as directed on package   naproxen (Naprosyn) 250 mg tablet   No No   Sig: Take 1 tablet (250 mg total) by mouth 2 (two) times a day as needed for mild pain for up to 30 doses   naproxen (Naprosyn) 500 mg tablet   No No   Sig: Take 1 tablet (500 mg total) by mouth 2 (two) times a day with  meals      Facility-Administered Medications: None       History reviewed. No pertinent past medical history.    History reviewed. No pertinent surgical history.    History reviewed. No pertinent family history.  I have reviewed and agree with the history as documented.    E-Cigarette/Vaping    E-Cigarette Use Never User      E-Cigarette/Vaping Substances    Nicotine No     THC No     CBD No     Flavoring No     Other No     Unknown No      Social History     Tobacco Use    Smoking status: Some Days     Types: Cigarettes    Smokeless tobacco: Never   Vaping Use    Vaping status: Never Used   Substance Use Topics    Alcohol use: Yes    Drug use: Never       Review of Systems   Genitourinary:  Positive for pelvic pain and vaginal bleeding.   All other systems reviewed and are negative.      Physical Exam  Physical Exam  Vitals and nursing note reviewed.   Constitutional:       General: She is not in acute distress.     Appearance: Normal appearance. She is not ill-appearing.   HENT:      Head: Normocephalic and atraumatic.      Right Ear: External ear normal.      Left Ear: External ear normal.      Nose: Nose normal.      Mouth/Throat:      Mouth: Mucous membranes are moist.   Eyes:      General:         Right eye: No discharge.         Left eye: No discharge.      Conjunctiva/sclera: Conjunctivae normal.   Cardiovascular:      Rate and Rhythm: Normal rate and regular rhythm.      Pulses: Normal pulses.      Heart sounds: No murmur heard.  Pulmonary:      Effort: Pulmonary effort is normal.      Breath sounds: Normal breath sounds.   Abdominal:      General: Abdomen is flat. There is no distension.      Tenderness: There is abdominal tenderness (mild tenderness suprapubic and lateral to suprapubic region b/l). There is no guarding or rebound.   Musculoskeletal:         General: Normal range of motion.      Cervical back: Normal range of motion.   Skin:     General: Skin is warm.      Capillary Refill: Capillary  refill takes less than 2 seconds.      Findings: No rash.   Neurological:      General: No focal deficit present.      Mental Status: She is alert. Mental status is at baseline.   Psychiatric:         Mood and Affect: Mood normal.         Behavior: Behavior normal.         Vital Signs  ED Triage Vitals   Temperature Pulse Respirations Blood Pressure SpO2   01/15/24 1038 01/15/24 1038 01/15/24 1038 01/15/24 1038 01/15/24 1038   98.4 °F (36.9 °C) (!) 106 16 150/84 98 %      Temp Source Heart Rate Source Patient Position - Orthostatic VS BP Location FiO2 (%)   01/15/24 1038 01/15/24 1038 01/15/24 1038 01/15/24 1038 --   Oral Monitor Sitting Left arm       Pain Score       01/15/24 1230       No Pain           Vitals:    01/15/24 1038 01/15/24 1230   BP: 150/84 139/74   Pulse: (!) 106 94   Patient Position - Orthostatic VS: Sitting          Visual Acuity      ED Medications  Medications   potassium chloride (K-DUR,KLOR-CON) CR tablet 20 mEq (20 mEq Oral Given 1/15/24 1143)       Diagnostic Studies  Results Reviewed       Procedure Component Value Units Date/Time    UA w Reflex to Microscopic w Reflex to Culture [[id number]]  (Normal) Collected: 01/15/24 1246    Lab Status: Final result Specimen: Urine, Clean Catch Updated: 01/15/24 1314     Color, UA Yellow     Clarity, UA Clear     Specific Gravity, UA 1.010     pH, UA 6.0     Leukocytes, UA Negative     Nitrite, UA Negative     Protein, UA Negative mg/dl      Glucose, UA Negative mg/dl      Ketones, UA Negative mg/dl      Urobilinogen, UA 0.2 E.U./dl      Bilirubin, UA Negative     Occult Blood, UA Negative    hCG, quantitative [[id number]]  (Abnormal) Collected: 01/15/24 1057    Lab Status: Final result Specimen: Blood from Arm, Left Updated: 01/15/24 1153     HCG, Quant 45,412 mIU/mL     Narrative:       Expected Ranges:    HCG results between 5 and 25 mIU/mL may be indicative of early pregnancy but should be interpreted in light of the total clinical  presentation.    HCG can rise to detectable levels in bob and post menopausal women (0-11.6 mIU/mL).     Approximate               Approximate HCG  Gestation age          Concentration ( mIU/mL)  _____________          ______________________   Weeks                      HCG values  0.2-1                       5-50  1-2                           2-3                         100-5000  3-4                         500-31610  4-5                         1000-05806  5-6                         08736-433999  6-8                         38208-058188  8-12                        13072-185379      Basic metabolic panel [[id number]]  (Abnormal) Collected: 01/15/24 1057    Lab Status: Final result Specimen: Blood from Arm, Left Updated: 01/15/24 1124     Sodium 135 mmol/L      Potassium 3.3 mmol/L      Chloride 105 mmol/L      CO2 22 mmol/L      ANION GAP 8 mmol/L      BUN 10 mg/dL      Creatinine 0.70 mg/dL      Glucose 126 mg/dL      Calcium 9.0 mg/dL      eGFR 108 ml/min/1.73sq m     Narrative:      National Kidney Disease Foundation guidelines for Chronic Kidney Disease (CKD):     Stage 1 with normal or high GFR (GFR > 90 mL/min/1.73 square meters)    Stage 2 Mild CKD (GFR = 60-89 mL/min/1.73 square meters)    Stage 3A Moderate CKD (GFR = 45-59 mL/min/1.73 square meters)    Stage 3B Moderate CKD (GFR = 30-44 mL/min/1.73 square meters)    Stage 4 Severe CKD (GFR = 15-29 mL/min/1.73 square meters)    Stage 5 End Stage CKD (GFR <15 mL/min/1.73 square meters)  Note: GFR calculation is accurate only with a steady state creatinine    CBC and differential [[id number]]  (Abnormal) Collected: 01/15/24 1057    Lab Status: Final result Specimen: Blood from Arm, Left Updated: 01/15/24 1107     WBC 7.86 Thousand/uL      RBC 4.35 Million/uL      Hemoglobin 11.9 g/dL      Hematocrit 35.8 %      MCV 82 fL      MCH 27.4 pg      MCHC 33.2 g/dL      RDW 14.5 %      MPV 9.6 fL      Platelets 324 Thousands/uL      nRBC 0 /100 WBCs       Neutrophils Relative 76 %      Immat GRANS % 0 %      Lymphocytes Relative 17 %      Monocytes Relative 5 %      Eosinophils Relative 1 %      Basophils Relative 1 %      Neutrophils Absolute 5.97 Thousands/µL      Immature Grans Absolute 0.03 Thousand/uL      Lymphocytes Absolute 1.31 Thousands/µL      Monocytes Absolute 0.42 Thousand/µL      Eosinophils Absolute 0.08 Thousand/µL      Basophils Absolute 0.05 Thousands/µL                    US OB < 14 weeks with transvaginal   Final Result by Noa Haynes MD (01/15 1258)         1. Single live intrauterine gestation at 6 weeks 1 days, cardiac activity is present although heart rate is low which is frequently normal in early gestation.   ALDAIR 9/8/2024 based on current exam.   Follow-up evaluation could be obtained in 2 weeks interval.   2. Uterine myomas and simple right ovarian cyst.   The study was marked in EPIC for immediate notification.         Workstation performed: [id number]                    Procedures  Procedures         ED Course  ED Course as of 01/15/24 1329   Mon Wilson 15, 2024   1228 ABO Grouping: A   1228 Rh Factor: Positive   1228 Antibody Screen: Negative   1228 No need for RhoGAM.   1236 HCG QUANTITATIVE(!): 45,412                                             Medical Decision Making  Patient presenting with vaginal spotting.  Differential includes but not limited to miscarriage, UTI, vaginal bleeding, ovarian torsion less likely as pain on both sides, extrauterine pregnancy.    Ultrasound with fibroid, ovarian cyst, intrauterine pregnancy with fetal bradycardia, mild hypokalemia.  Informed patient of findings.  Follow-up with OB/GYN.  Return precautions discussed.    Problems Addressed:  Pelvic cramping: acute illness or injury  Vaginal bleeding affecting early pregnancy: acute illness or injury    Amount and/or Complexity of Data Reviewed  Labs: ordered. Decision-making details documented in ED Course.  Radiology:  ordered.    Risk  Prescription drug management.             Disposition  Final diagnoses:   Pelvic cramping   Vaginal bleeding affecting early pregnancy     Time reflects when diagnosis was documented in both MDM as applicable and the Disposition within this note       Time User Action Codes Description Comment    1/15/2024  1:01 PM Vivek Rios Add [N93.9] Vaginal spotting     1/15/2024  1:01 PM Jigar Roisus Add [R10.9] Abdominal cramping     1/15/2024  1:02 PM Jigar Riosus Remove [R10.9] Abdominal cramping     1/15/2024  1:02 PM Blanca Vivek Add [R10.2] Pelvic cramping     1/15/2024  1:02 PM Jigar Riosus Add [O20.9] Vaginal bleeding affecting early pregnancy     1/15/2024  1:02 PM Jigar Riosus Modify [R10.2] Pelvic cramping     1/15/2024  1:02 PM Jigar Riosus Remove [N93.9] Vaginal spotting     1/15/2024  1:02 PM Jigar Riosus Modify [R10.2] Pelvic cramping     1/15/2024  1:02 PM Jigar Riosus Modify [O20.9] Vaginal bleeding affecting early pregnancy           ED Disposition       ED Disposition   Discharge    Condition   Stable    Date/Time   Mon Wilson 15, 2024  1:01 PM    Comment   Zahra Rodarteo discharge to home/self care.                   Follow-up Information       Follow up With Specialties Details Why Contact Info Additional Information    [facility] Emergency Department Emergency Medicine  If symptoms worsen [address] [place] [id number]  [phone] [facility] Emergency Department, [address] [place] [phone]            Patient's Medications   Discharge Prescriptions    No medications on file       No discharge procedures on file.    PDMP Review       None            ED Provider  Electronically Signed by             Vivek Rios DO  01/15/24 6449

## 2024-01-15 NOTE — DISCHARGE INSTRUCTIONS
Ultrasound today noted that your pregnancy is located in the correct location.  The heart rate of the fetus was noted to be lower than normal.  This can be a regular finding in early pregnancy but it is also possible that this is abnormal.    Ultrasound estimated expected day of delivery as 9/8/2024       Ultrasound also showed an ovarian cyst and uterine myoma.    Follow-up with your OB/GYN.    Come back for significant pain, significant increase in vaginal bleeding, signs of blood loss such as shortness of breath, lightheadedness.

## 2024-01-22 ENCOUNTER — ULTRASOUND (OUTPATIENT)
Dept: OBGYN CLINIC | Facility: CLINIC | Age: 39
End: 2024-01-22

## 2024-01-22 VITALS
DIASTOLIC BLOOD PRESSURE: 78 MMHG | SYSTOLIC BLOOD PRESSURE: 124 MMHG | BODY MASS INDEX: 23.21 KG/M2 | WEIGHT: 131 LBS | HEIGHT: 63 IN

## 2024-01-22 DIAGNOSIS — Z34.91 PRENATAL CARE IN FIRST TRIMESTER: ICD-10-CM

## 2024-01-22 DIAGNOSIS — Z34.91 PRENATAL CARE IN FIRST TRIMESTER: Primary | ICD-10-CM

## 2024-01-22 RX ORDER — PNV NO.52/IRON/FA/OMEGA-3/DHA 29-1-200MG
1 COMBINATION PACKAGE (EA) ORAL DAILY
Qty: 90 EACH | Refills: 3 | Status: SHIPPED | OUTPATIENT
Start: 2024-01-22 | End: 2024-01-23

## 2024-01-22 RX ORDER — DIPHENHYDRAMINE HYDROCHLORIDE 25 MG/1
25 CAPSULE ORAL DAILY
Qty: 90 TABLET | Refills: 1 | Status: SHIPPED | OUTPATIENT
Start: 2024-01-22

## 2024-01-22 NOTE — PROGRESS NOTES
Zahra Herman is a 38-year-old G5, P3 with 3 prior vaginal deliveries and 1 miscarriage here for viability scan.  Patient has been to ER twice with some bleeding and cramping most recent ER scan showed a 6-week pregnancy patient noted to have a small myoma, also with a simple ovarian cyst.  Abdominal ultrasound performed today showing a viable pregnancy measuring 7 weeks 6 days with a good heartbeat in the 140s no visible collection appropriate reaction ring and round gestational sac.  Patient reports bleeding has decreased to some pink when wiping.  She is having significant nausea some headache and cramping though it is much improved.  Patient reports no vomiting, loss of fluid edema, domestic violence, smoking.  Urine negative negative will order prenatal labs had [facility] care in [place] previously.  Had varicella in the past had all immunizations will return for new OB.  Prescribed B6 and prenatal vitamin

## 2024-01-23 RX ORDER — MULTIVIT-MIN/IRON/FOLIC ACID/K 18-600-40
1 CAPSULE ORAL DAILY
Qty: 30 CAPSULE | Refills: 5 | Status: SHIPPED | OUTPATIENT
Start: 2024-01-23 | End: 2024-01-23 | Stop reason: SDUPTHER

## 2024-01-23 RX ORDER — MULTIVIT-MIN/IRON/FOLIC ACID/K 18-600-40
1 CAPSULE ORAL DAILY
Qty: 30 CAPSULE | Refills: 5 | Status: SHIPPED | OUTPATIENT
Start: 2024-01-23 | End: 2024-02-22

## 2024-01-23 NOTE — TELEPHONE ENCOUNTER
Reviewed formulary. Sent in new PNV.  Placed call to pharmacy to confirm they carry it.  they will not bill out any OTC products that can be purchased by the patient. Placed call to patient, left message on answering machine requesting a return call if there is a different pharmacy the patient would like us to try to use.

## 2024-02-22 NOTE — PROGRESS NOTES
OB/GYN  PN Visit  Zahra Herman  [id number]  2024  12:56 PM  HANS Montelongo    S: 38 y.o.  12w1d here for PN visit. Pregnancy complicated by myoma and AMA.     OB Complaints:  Denies c/o v/ha, no edema, no smoking, no DV.   No vb/lof  No cramping/ctxns or signs of PTL.    She reports nausea; tolerable  Advised patient to established care with MFM.     O:    Pre- Vitals      Flowsheet Row Most Recent Value   Prenatal Assessment    Fetal Heart Rate 164   Prenatal Vitals    Blood Pressure 118/72   Weight - Scale 60.8 kg (134 lb)   Urine Albumin/Glucose    Dilation/Effacement/Station    Vaginal Drainage    Edema               Gen: no acute distress, nonlabored breathing.  OB exam completed: fundal height, +FHT  Urine: -/-    A/P:  #1. 12w1d GESTATION  Physical exam and cultures done today. Last pap: . Pap done today per ASCCP guidelines.   AFP to be given at next visit.  Referral placed to MFM    RTC in 4 weeks

## 2024-02-27 ENCOUNTER — ULTRASOUND (OUTPATIENT)
Dept: OBGYN CLINIC | Facility: CLINIC | Age: 39
End: 2024-02-27
Payer: COMMERCIAL

## 2024-02-27 VITALS
HEIGHT: 63 IN | BODY MASS INDEX: 23.74 KG/M2 | WEIGHT: 134 LBS | DIASTOLIC BLOOD PRESSURE: 72 MMHG | SYSTOLIC BLOOD PRESSURE: 118 MMHG

## 2024-02-27 DIAGNOSIS — Z34.92 SECOND TRIMESTER PREGNANCY: Primary | ICD-10-CM

## 2024-02-27 PROCEDURE — G0145 SCR C/V CYTO,THINLAYER,RESCR: HCPCS

## 2024-02-27 PROCEDURE — 87491 CHLMYD TRACH DNA AMP PROBE: CPT

## 2024-02-27 PROCEDURE — G0476 HPV COMBO ASSAY CA SCREEN: HCPCS

## 2024-02-27 PROCEDURE — 87591 N.GONORRHOEAE DNA AMP PROB: CPT

## 2024-02-27 PROCEDURE — 99213 OFFICE O/P EST LOW 20 MIN: CPT

## 2024-02-27 PROCEDURE — 87661 TRICHOMONAS VAGINALIS AMPLIF: CPT

## 2024-02-28 ENCOUNTER — TELEPHONE (OUTPATIENT)
Facility: HOSPITAL | Age: 39
End: 2024-02-28

## 2024-02-28 ENCOUNTER — TELEPHONE (OUTPATIENT)
Age: 39
End: 2024-02-28

## 2024-02-28 LAB
HPV HR 12 DNA CVX QL NAA+PROBE: NEGATIVE
HPV16 DNA CVX QL NAA+PROBE: NEGATIVE
HPV18 DNA CVX QL NAA+PROBE: NEGATIVE

## 2024-02-28 NOTE — TELEPHONE ENCOUNTER
Called patient to schedule MFM appointment, based on referral issued to Maternal Fetal Medicine by OB office.  Left voicemail requesting patient to call back and schedule appointment, with office number for return call [phone].

## 2024-02-29 LAB
C TRACH DNA SPEC QL NAA+PROBE: NEGATIVE
N GONORRHOEA DNA SPEC QL NAA+PROBE: NEGATIVE
T VAGINALIS DNA SPEC QL NAA+PROBE: NEGATIVE

## 2024-03-01 ENCOUNTER — TELEPHONE (OUTPATIENT)
Age: 39
End: 2024-03-01

## 2024-03-01 NOTE — TELEPHONE ENCOUNTER
Left message on answering machine for patient to call office to schedule US can do Nuchal by 3/12 if not then needs Detail US.

## 2024-03-02 LAB
LAB AP GYN PRIMARY INTERPRETATION: NORMAL
LAB AP LMP: NORMAL
Lab: NORMAL
PATH INTERP SPEC-IMP: NORMAL

## 2024-03-05 ENCOUNTER — TELEPHONE (OUTPATIENT)
Facility: HOSPITAL | Age: 39
End: 2024-03-05

## 2024-03-05 ENCOUNTER — TELEPHONE (OUTPATIENT)
Age: 39
End: 2024-03-05

## 2024-03-05 NOTE — TELEPHONE ENCOUNTER
----- Message from HANS Montelongo sent at 3/5/2024  7:19 AM EST -----  Please inform patient that her pap screening was normal with negative HPV testing. Negative STI cultures.

## 2024-03-05 NOTE — TELEPHONE ENCOUNTER
Per comm consent lm to pt with results and recommendations of pap and cultures from [facility].     If yeast symptoms can use monistat OTC

## 2024-03-05 NOTE — TELEPHONE ENCOUNTER
Called patient to schedule MFM appointment, based on referral issued to Maternal Fetal Medicine by OB office.  Left voicemail requesting patient to call back and schedule appointment, with office number for return call [phone].

## 2024-03-27 NOTE — PROGRESS NOTES
VISIT 38 yr old  at 16w3d: (+) HA - history of migraines - tylenol doesn't help much; advised to try magnesium 400 mg daily for prevention; (+) cramping - mild - sounds round ligamnet last week; Denies n/v/vb/lof/edema/dv/smoking; urine neg/neg  Labs - needs to complete first tri labs -reprinted order; PNC - no first trimester ultrasound? - never scheduled and patient didn't seem to know about it; reviewed and offered Quad screen which she accepts to be done; needs to schedule level 2 ultrasound at 20w - referral placed  PNVs + DHA - tolerating daily  No FM yet    Encouraged hydration; Encouraged infection prevention/handwashing.  RTO in 4 weeks for routine ob check or sooner if needed     .

## 2024-03-28 ENCOUNTER — ROUTINE PRENATAL (OUTPATIENT)
Dept: OBGYN CLINIC | Facility: CLINIC | Age: 39
End: 2024-03-28
Payer: COMMERCIAL

## 2024-03-28 ENCOUNTER — TELEPHONE (OUTPATIENT)
Age: 39
End: 2024-03-28

## 2024-03-28 VITALS — BODY MASS INDEX: 23.74 KG/M2 | WEIGHT: 134 LBS | DIASTOLIC BLOOD PRESSURE: 78 MMHG | SYSTOLIC BLOOD PRESSURE: 120 MMHG

## 2024-03-28 DIAGNOSIS — Z3A.16 16 WEEKS GESTATION OF PREGNANCY: Primary | ICD-10-CM

## 2024-03-28 DIAGNOSIS — O09.511 ADVANCED MATERNAL AGE, PRIMIGRAVIDA IN FIRST TRIMESTER, ANTEPARTUM: ICD-10-CM

## 2024-03-28 PROCEDURE — 99213 OFFICE O/P EST LOW 20 MIN: CPT | Performed by: PHYSICIAN ASSISTANT

## 2024-04-02 ENCOUNTER — TELEPHONE (OUTPATIENT)
Age: 39
End: 2024-04-02

## 2024-04-02 NOTE — TELEPHONE ENCOUNTER
Left voicemail for patient to call [phone] to schedule a new patient detailed ultrasound. ALDAIR 09/09/2024. Detailed should be scheduled between April 22- May 6, 2024.

## 2024-04-02 NOTE — TELEPHONE ENCOUNTER
Pt called stating she needs a WIC letter proving she is pregnant with ALDAIR. Please fax to [phone]. Please reach out to pt when done

## 2024-04-17 NOTE — PROGRESS NOTES
OB/GYN  PN Visit  Zahra Herman  [id number]  2024  12:26 PM  Denia Barboza PA-C    S: 38 y.o.  19w2d here for PN visit. Pregnancy complicated by AMA.     OB complaints:  Denies c/o n/v/ha, no edema, no smoking, no DV.   No vb/lof  No cramping/ctxns or signs of PTL.    She reports that she is feeling much better.  Has appt to [facility] care w [facility] planned 24    O:    Pre- Vitals      Flowsheet Row Most Recent Value   Prenatal Assessment    Fetal Heart Rate 152   Fundal Height (cm) 20 cm   Movement Present   Prenatal Vitals    Blood Pressure 112/72   Weight - Scale 61.2 kg (135 lb)   Urine Albumin/Glucose    Dilation/Effacement/Station    Vaginal Drainage    Draining Fluid No   Edema    LLE Edema None   RLE Edema None   Facial Edema None              Gen: no acute distress, nonlabored breathing.  OB exam completed: fundal height, +FHT.  Urine: -/-    A/P:  #1. 20w0d GESTATION  Labor precautions reviewed and FKC at the end of second trimester.   She has not had her initial labs drawn yet. Encouraged her to do so, including QS.         RTC in 4 weeks    Denia Barboza PA-C  2024  12:26 PM

## 2024-04-22 ENCOUNTER — ROUTINE PRENATAL (OUTPATIENT)
Dept: OBGYN CLINIC | Facility: CLINIC | Age: 39
End: 2024-04-22
Payer: COMMERCIAL

## 2024-04-22 VITALS — BODY MASS INDEX: 23.91 KG/M2 | WEIGHT: 135 LBS | DIASTOLIC BLOOD PRESSURE: 72 MMHG | SYSTOLIC BLOOD PRESSURE: 112 MMHG

## 2024-04-22 DIAGNOSIS — Z34.92 SECOND TRIMESTER PREGNANCY: Primary | ICD-10-CM

## 2024-04-22 PROCEDURE — 99212 OFFICE O/P EST SF 10 MIN: CPT | Performed by: PHYSICIAN ASSISTANT

## 2024-04-23 RX ORDER — ASPIRIN 81 MG/1
162 TABLET, CHEWABLE ORAL DAILY
Qty: 180 TABLET | Refills: 2 | Status: CANCELLED | OUTPATIENT
Start: 2024-04-23

## 2024-04-24 ENCOUNTER — ROUTINE PRENATAL (OUTPATIENT)
Facility: HOSPITAL | Age: 39
End: 2024-04-24
Payer: COMMERCIAL

## 2024-04-24 VITALS
BODY MASS INDEX: 24.14 KG/M2 | SYSTOLIC BLOOD PRESSURE: 132 MMHG | HEART RATE: 81 BPM | DIASTOLIC BLOOD PRESSURE: 88 MMHG | HEIGHT: 63 IN | WEIGHT: 136.24 LBS

## 2024-04-24 DIAGNOSIS — O09.511 ADVANCED MATERNAL AGE, PRIMIGRAVIDA IN FIRST TRIMESTER, ANTEPARTUM: ICD-10-CM

## 2024-04-24 DIAGNOSIS — Z3A.20 20 WEEKS GESTATION OF PREGNANCY: ICD-10-CM

## 2024-04-24 DIAGNOSIS — O09.511 PRIMIGRAVIDA OF ADVANCED MATERNAL AGE IN FIRST TRIMESTER: Primary | ICD-10-CM

## 2024-04-24 DIAGNOSIS — Z36.86 ENCOUNTER FOR ANTENATAL SCREENING FOR CERVICAL LENGTH: ICD-10-CM

## 2024-04-24 DIAGNOSIS — O35.EXX0 PYELECTASIS OF FETUS ON PRENATAL ULTRASOUND: ICD-10-CM

## 2024-04-24 DIAGNOSIS — Z3A.16 16 WEEKS GESTATION OF PREGNANCY: ICD-10-CM

## 2024-04-24 DIAGNOSIS — Z36.3 ENCOUNTER FOR ANTENATAL SCREENING FOR MALFORMATION: ICD-10-CM

## 2024-04-24 PROCEDURE — 76817 TRANSVAGINAL US OBSTETRIC: CPT | Performed by: OBSTETRICS & GYNECOLOGY

## 2024-04-24 PROCEDURE — 76811 OB US DETAILED SNGL FETUS: CPT | Performed by: OBSTETRICS & GYNECOLOGY

## 2024-04-24 PROCEDURE — 99244 OFF/OP CNSLTJ NEW/EST MOD 40: CPT | Performed by: OBSTETRICS & GYNECOLOGY

## 2024-04-24 NOTE — LETTER
"2024    Jer Swanson PA-C  [address] [place] [address]    Patient: Zahra Herman   YOB: 1985   Date of Visit: 2024   Gestational age 20w2d   Nature of this communication: Routine though please note pyelectasis. Patient's chart/prior note said G1, she is a , I updated her Epic charting to reflect this. She declined aneuploidy screening and will see us back at 30-32 weeks for growth and missed anatomy + follow up kidneys       Dear Jer Swanson,    This patient was seen recently in our  office.  The content of my evaluation today is in the ultrasound report under \"OB Procedures\" tab.     Please don't hesitate to contact our office with any concerns or questions.     Sincerely,      Ayla Schumacher MD  Attending Physician, Maternal-Fetal Medicine  [facility]      "

## 2024-04-24 NOTE — PATIENT INSTRUCTIONS
The use of low dose aspirin in pregnancy (162mg) is recommended in women with a high risk, or multiple moderate risk factors for preeclampsia. Aspirin therapy should be initiated between 12-28 weeks gestation, and is most effective if started prior to 16 weeks gestation, and continued until 36 weeks gestation. Low dose aspirin in pregnancy has been shown to reduce the incidence of preeclampsia in women with risk factors, and has been shown to be safe and without significant maternal or fetal risk. In light of your risk factors for preeclampsia, including: Primiparity (first pregnancy) and Maternal Age 35 or greater I recommend initiating aspirin therapy.

## 2024-04-24 NOTE — PROGRESS NOTES
"[facility]: Zahra Herman was seen today for anatomic survey and cervical length screening ultrasound.  See ultrasound report under \"OB Procedures\" tab.      MDM:   I. Diagnoses/Problems addressed:  Aneuploidy screening, AMA, UTDA1  II.  Data: I reviewed initial prenatal  III.  Risk of morbidity: Moderate (discussed ASA, declined)    Please don't hesitate to contact our office with any concerns or questions.  -Ayla Schumacher MD      "

## 2024-04-25 ENCOUNTER — TELEPHONE (OUTPATIENT)
Dept: PERINATAL CARE | Facility: OTHER | Age: 39
End: 2024-04-25

## 2024-04-25 NOTE — TELEPHONE ENCOUNTER
Called pt to schedule follow up ultrasound at 30 weeks for growth and and missed anatomy. Pt will be 30 weeks around 7/5. Requested pt call back at [phone] to schedule.

## 2024-05-09 ENCOUNTER — TELEPHONE (OUTPATIENT)
Dept: OBGYN CLINIC | Facility: CLINIC | Age: 39
End: 2024-05-09

## 2024-05-09 ENCOUNTER — HOSPITAL ENCOUNTER (EMERGENCY)
Facility: HOSPITAL | Age: 39
Discharge: HOME | End: 2024-05-09
Attending: EMERGENCY MEDICINE
Payer: COMMERCIAL

## 2024-05-09 VITALS
WEIGHT: 140 LBS | DIASTOLIC BLOOD PRESSURE: 78 MMHG | RESPIRATION RATE: 18 BRPM | OXYGEN SATURATION: 98 % | HEIGHT: 63 IN | TEMPERATURE: 98.2 F | HEART RATE: 88 BPM | BODY MASS INDEX: 24.8 KG/M2 | SYSTOLIC BLOOD PRESSURE: 122 MMHG

## 2024-05-09 DIAGNOSIS — J32.9 RHINOSINUSITIS: Primary | ICD-10-CM

## 2024-05-09 LAB
ALBUMIN SERPL BCP-MCNC: 3.5 G/DL (ref 3.5–5)
ALP SERPL-CCNC: 56 U/L (ref 34–104)
ALT SERPL W P-5'-P-CCNC: 11 U/L (ref 7–52)
ANION GAP SERPL CALCULATED.3IONS-SCNC: 11 MMOL/L (ref 4–13)
AST SERPL W P-5'-P-CCNC: 12 U/L (ref 13–39)
BASOPHILS # BLD AUTO: 0.03 THOUSANDS/ÂΜL (ref 0–0.1)
BASOPHILS NFR BLD AUTO: 0 % (ref 0–1)
BILIRUB SERPL-MCNC: 0.24 MG/DL (ref 0.2–1)
BILIRUB UR QL STRIP: NEGATIVE
BUN SERPL-MCNC: 10 MG/DL (ref 5–25)
CALCIUM SERPL-MCNC: 8.8 MG/DL (ref 8.4–10.2)
CHLORIDE SERPL-SCNC: 106 MMOL/L (ref 96–108)
CLARITY UR: CLEAR
CO2 SERPL-SCNC: 20 MMOL/L (ref 21–32)
COLOR UR: YELLOW
CREAT SERPL-MCNC: 0.48 MG/DL (ref 0.6–1.3)
EOSINOPHIL # BLD AUTO: 0.2 THOUSAND/ÂΜL (ref 0–0.61)
EOSINOPHIL NFR BLD AUTO: 2 % (ref 0–6)
ERYTHROCYTE [DISTWIDTH] IN BLOOD BY AUTOMATED COUNT: 14 % (ref 11.6–15.1)
FLUAV RNA RESP QL NAA+PROBE: NEGATIVE
FLUBV RNA RESP QL NAA+PROBE: NEGATIVE
GFR SERPL CREATININE-BSD FRML MDRD: 123 ML/MIN/1.73SQ M
GLUCOSE SERPL-MCNC: 154 MG/DL (ref 65–140)
GLUCOSE UR STRIP-MCNC: NEGATIVE MG/DL
HCT VFR BLD AUTO: 33.6 % (ref 34.8–46.1)
HGB BLD-MCNC: 11.4 G/DL (ref 11.5–15.4)
HGB UR QL STRIP.AUTO: NEGATIVE
IMM GRANULOCYTES # BLD AUTO: 0.12 THOUSAND/UL (ref 0–0.2)
IMM GRANULOCYTES NFR BLD AUTO: 1 % (ref 0–2)
KETONES UR STRIP-MCNC: NEGATIVE MG/DL
LEUKOCYTE ESTERASE UR QL STRIP: NEGATIVE
LYMPHOCYTES # BLD AUTO: 1.14 THOUSANDS/ÂΜL (ref 0.6–4.47)
LYMPHOCYTES NFR BLD AUTO: 12 % (ref 14–44)
MCH RBC QN AUTO: 28.9 PG (ref 26.8–34.3)
MCHC RBC AUTO-ENTMCNC: 33.9 G/DL (ref 31.4–37.4)
MCV RBC AUTO: 85 FL (ref 82–98)
MONOCYTES # BLD AUTO: 0.46 THOUSAND/ÂΜL (ref 0.17–1.22)
MONOCYTES NFR BLD AUTO: 5 % (ref 4–12)
NEUTROPHILS # BLD AUTO: 7.93 THOUSANDS/ÂΜL (ref 1.85–7.62)
NEUTS SEG NFR BLD AUTO: 80 % (ref 43–75)
NITRITE UR QL STRIP: NEGATIVE
NRBC BLD AUTO-RTO: 0 /100 WBCS
PH UR STRIP.AUTO: 6.5 [PH]
PLATELET # BLD AUTO: 266 THOUSANDS/UL (ref 149–390)
PMV BLD AUTO: 9.7 FL (ref 8.9–12.7)
POTASSIUM SERPL-SCNC: 3.2 MMOL/L (ref 3.5–5.3)
PROT SERPL-MCNC: 6.6 G/DL (ref 6.4–8.4)
PROT UR STRIP-MCNC: NEGATIVE MG/DL
RBC # BLD AUTO: 3.95 MILLION/UL (ref 3.81–5.12)
RSV RNA RESP QL NAA+PROBE: NEGATIVE
S PYO DNA THROAT QL NAA+PROBE: NOT DETECTED
SARS-COV-2 RNA RESP QL NAA+PROBE: NEGATIVE
SODIUM SERPL-SCNC: 137 MMOL/L (ref 135–147)
SP GR UR STRIP.AUTO: 1.01 (ref 1–1.03)
UROBILINOGEN UR QL STRIP.AUTO: 0.2 E.U./DL
WBC # BLD AUTO: 9.88 THOUSAND/UL (ref 4.31–10.16)

## 2024-05-09 PROCEDURE — 87086 URINE CULTURE/COLONY COUNT: CPT | Performed by: PHYSICIAN ASSISTANT

## 2024-05-09 PROCEDURE — 99283 EMERGENCY DEPT VISIT LOW MDM: CPT

## 2024-05-09 PROCEDURE — 85025 COMPLETE CBC W/AUTO DIFF WBC: CPT | Performed by: PHYSICIAN ASSISTANT

## 2024-05-09 PROCEDURE — 36415 COLL VENOUS BLD VENIPUNCTURE: CPT | Performed by: PHYSICIAN ASSISTANT

## 2024-05-09 PROCEDURE — 93005 ELECTROCARDIOGRAM TRACING: CPT

## 2024-05-09 PROCEDURE — 99284 EMERGENCY DEPT VISIT MOD MDM: CPT | Performed by: PHYSICIAN ASSISTANT

## 2024-05-09 PROCEDURE — 80053 COMPREHEN METABOLIC PANEL: CPT | Performed by: PHYSICIAN ASSISTANT

## 2024-05-09 PROCEDURE — 0241U HB NFCT DS VIR RESP RNA 4 TRGT: CPT | Performed by: PHYSICIAN ASSISTANT

## 2024-05-09 PROCEDURE — 81003 URINALYSIS AUTO W/O SCOPE: CPT | Performed by: PHYSICIAN ASSISTANT

## 2024-05-09 PROCEDURE — 96360 HYDRATION IV INFUSION INIT: CPT

## 2024-05-09 PROCEDURE — 87651 STREP A DNA AMP PROBE: CPT | Performed by: PHYSICIAN ASSISTANT

## 2024-05-09 PROCEDURE — 96361 HYDRATE IV INFUSION ADD-ON: CPT

## 2024-05-09 RX ORDER — POTASSIUM CHLORIDE 20 MEQ/1
40 TABLET, EXTENDED RELEASE ORAL ONCE
Status: COMPLETED | OUTPATIENT
Start: 2024-05-09 | End: 2024-05-09

## 2024-05-09 RX ADMIN — POTASSIUM CHLORIDE 40 MEQ: 1500 TABLET, EXTENDED RELEASE ORAL at 11:47

## 2024-05-09 RX ADMIN — SODIUM CHLORIDE 1000 ML: 0.9 INJECTION, SOLUTION INTRAVENOUS at 10:47

## 2024-05-09 NOTE — ED PROVIDER NOTES
History  Chief Complaint   Patient presents with    Chills     Pt states she has been having body aches, sore throat, chills, headaches, coughing, sneezing since .      No PMH  No PSH    Pt presents to ED c/o 5 day h/o symptoms that began with sore throat, congestion, continued with diffuse myalgias, chills/tactile fever-none noted here, intermittent, diffuse headaches, dry cough, sneezing  Pt denies cp, abd pain, NVD, urinary complaints, bowel changes, abn vag bleeding/dc.  Pt , 22 weeks pregnant, good prenatal care, pt with good + Fetal movement        Prior to Admission Medications   Prescriptions Last Dose Informant Patient Reported? Taking?   Prenat MV-Min w/Fe-Folate-DHA (Prenatal Complete) CPPK   Yes No   Sig: Take by mouth   Pyridoxine HCl (vitamin B-6) 25 MG tablet   No No   Sig: Take 1 tablet (25 mg total) by mouth daily To help with pregnancy nausea   acetaminophen (TYLENOL) 500 mg tablet   No No   Sig: Take 2 tablets (1,000 mg total) by mouth every 6 (six) hours as needed for mild pain for up to 20 doses      Facility-Administered Medications: None       History reviewed. No pertinent past medical history.    History reviewed. No pertinent surgical history.    History reviewed. No pertinent family history.  I have reviewed and agree with the history as documented.    E-Cigarette/Vaping    E-Cigarette Use Never User      E-Cigarette/Vaping Substances    Nicotine No     THC No     CBD No     Flavoring No     Other No     Unknown No      Social History     Tobacco Use    Smoking status: Some Days     Types: Cigarettes    Smokeless tobacco: Never   Vaping Use    Vaping status: Never Used   Substance Use Topics    Alcohol use: Yes    Drug use: Never       Review of Systems   Constitutional:  Negative for chills and fever.   HENT:  Positive for congestion, rhinorrhea and sinus pressure. Negative for facial swelling, nosebleeds, postnasal drip and trouble swallowing.    Respiratory:  Positive for  cough and shortness of breath.    Cardiovascular:  Negative for chest pain, palpitations and leg swelling.   Gastrointestinal:  Negative for diarrhea, nausea and vomiting.   Genitourinary:  Negative for dysuria, flank pain, frequency, hematuria, vaginal bleeding and vaginal discharge.   Musculoskeletal:  Positive for myalgias.   Skin:  Negative for rash.   Neurological:  Positive for headaches.   Hematological:  Negative for adenopathy.   Psychiatric/Behavioral:  Negative for sleep disturbance.    All other systems reviewed and are negative.      Physical Exam  Physical Exam  Vitals and nursing note reviewed.   Constitutional:       General: She is not in acute distress.     Appearance: Normal appearance. She is well-developed. She is not ill-appearing.   HENT:      Head: Normocephalic and atraumatic.      Right Ear: External ear normal.      Left Ear: External ear normal.      Nose: Congestion and rhinorrhea (clear) present.      Mouth/Throat:      Mouth: Mucous membranes are moist.      Pharynx: Oropharynx is clear. No oropharyngeal exudate or posterior oropharyngeal erythema.   Eyes:      Conjunctiva/sclera: Conjunctivae normal.   Cardiovascular:      Rate and Rhythm: Regular rhythm. Tachycardia present.   Pulmonary:      Effort: Pulmonary effort is normal. No respiratory distress.      Breath sounds: Normal breath sounds. No wheezing or rhonchi.   Abdominal:      General: Bowel sounds are normal.      Palpations: Abdomen is soft.      Tenderness: There is no abdominal tenderness.      Comments: , 's   Musculoskeletal:         General: Normal range of motion.      Cervical back: Normal range of motion.      Right lower leg: No edema.      Left lower leg: No edema.   Lymphadenopathy:      Cervical: No cervical adenopathy.   Skin:     General: Skin is warm and dry.   Neurological:      General: No focal deficit present.      Mental Status: She is alert.   Psychiatric:         Behavior: Behavior  normal.         Vital Signs  ED Triage Vitals [05/09/24 1018]   Temperature Pulse Respirations Blood Pressure SpO2   98.2 °F (36.8 °C) (!) 125 18 146/89 97 %      Temp src Heart Rate Source Patient Position - Orthostatic VS BP Location FiO2 (%)   -- Monitor Sitting Left arm --      Pain Score       10 - Worst Possible Pain           Vitals:    05/09/24 1018 05/09/24 1130 05/09/24 1135 05/09/24 1215   BP: 146/89  117/72 122/78   Pulse: (!) 125 92  88   Patient Position - Orthostatic VS: Sitting  Sitting          Visual Acuity      ED Medications  Medications   sodium chloride 0.9 % bolus 1,000 mL (0 mL Intravenous Stopped 5/9/24 1231)   potassium chloride (Klor-Con M20) CR tablet 40 mEq (40 mEq Oral Given 5/9/24 1147)       Diagnostic Studies  Results Reviewed       Procedure Component Value Units Date/Time    UA w Reflex to Microscopic w Reflex to Culture [[id number]] Collected: 05/09/24 1143    Lab Status: Final result Specimen: Urine, Clean Catch Updated: 05/09/24 1211     Color, UA Yellow     Clarity, UA Clear     Specific Gravity, UA 1.015     pH, UA 6.5     Leukocytes, UA Negative     Nitrite, UA Negative     Protein, UA Negative mg/dl      Glucose, UA Negative mg/dl      Ketones, UA Negative mg/dl      Urobilinogen, UA 0.2 E.U./dl      Bilirubin, UA Negative     Occult Blood, UA Negative     URINE COMMENT --    Urine culture [[id number]] Collected: 05/09/24 1143    Lab Status: In process Specimen: Urine, Clean Catch Updated: 05/09/24 1211    FLU/RSV/COVID - if FLU/RSV clinically relevant [[id number]]  (Normal) Collected: 05/09/24 1045    Lab Status: Final result Specimen: Nares from Nose Updated: 05/09/24 1142     SARS-CoV-2 Negative     INFLUENZA A PCR Negative     INFLUENZA B PCR Negative     RSV PCR Negative    Narrative:      FOR PEDIATRIC PATIENTS - copy/paste COVID Guidelines URL to browser: [email or web address]    SARS-CoV-2 assay is a Nucleic Acid  Amplification assay intended for the  qualitative detection of nucleic acid from SARS-CoV-2 in nasopharyngeal  swabs. Results are for the presumptive identification of SARS-CoV-2 RNA.    Positive results are indicative of infection with SARS-CoV-2, the virus  causing COVID-19, but do not rule out bacterial infection or co-infection  with other viruses. Laboratories within the [place] and its  territories are required to report all positive results to the appropriate  public health authorities. Negative results do not preclude SARS-CoV-2  infection and should not be used as the sole basis for treatment or other  patient management decisions. Negative results must be combined with  clinical observations, patient history, and epidemiological information.  This test has not been FDA cleared or approved.    This test has been authorized by FDA under an Emergency Use Authorization  (EUA). This test is only authorized for the duration of time the  declaration that circumstances exist justifying the authorization of the  emergency use of an in vitro diagnostic tests for detection of SARS-CoV-2  virus and/or diagnosis of COVID-19 infection under section 564(b)(1) of  the Act, 21 U.S.C. 360bbb-3(b)(1), unless the authorization is terminated  or revoked sooner. The test has been validated but independent review by FDA  and CLIA is pending.    Test performed using [employer] GeneXpert: This RT-PCR assay targets N2,  a region unique to SARS-CoV-2. A conserved region in the E-gene was chosen  for pan-Sarbecovirus detection which includes SARS-CoV-2.    According to CMS-2020-01-R, this platform meets the definition of high-throughput technology.    Strep A PCR [[id number]]  (Normal) Collected: 05/09/24 1045    Lab Status: Final result Specimen: Throat Updated: 05/09/24 1127     STREP A PCR Not Detected    Comprehensive metabolic panel [[id number]]  (Abnormal) Collected: 05/09/24 1045    Lab Status: Final result Specimen: Blood  from Arm, Right Updated: 05/09/24 1110     Sodium 137 mmol/L      Potassium 3.2 mmol/L      Chloride 106 mmol/L      CO2 20 mmol/L      ANION GAP 11 mmol/L      BUN 10 mg/dL      Creatinine 0.48 mg/dL      Glucose 154 mg/dL      Calcium 8.8 mg/dL      AST 12 U/L      ALT 11 U/L      Alkaline Phosphatase 56 U/L      Total Protein 6.6 g/dL      Albumin 3.5 g/dL      Total Bilirubin 0.24 mg/dL      eGFR 123 ml/min/1.73sq m     Narrative:      National Kidney Disease Foundation guidelines for Chronic Kidney Disease (CKD):     Stage 1 with normal or high GFR (GFR > 90 mL/min/1.73 square meters)    Stage 2 Mild CKD (GFR = 60-89 mL/min/1.73 square meters)    Stage 3A Moderate CKD (GFR = 45-59 mL/min/1.73 square meters)    Stage 3B Moderate CKD (GFR = 30-44 mL/min/1.73 square meters)    Stage 4 Severe CKD (GFR = 15-29 mL/min/1.73 square meters)    Stage 5 End Stage CKD (GFR <15 mL/min/1.73 square meters)  Note: GFR calculation is accurate only with a steady state creatinine    CBC and differential [[id number]]  (Abnormal) Collected: 05/09/24 1045    Lab Status: Final result Specimen: Blood from Arm, Right Updated: 05/09/24 1054     WBC 9.88 Thousand/uL      RBC 3.95 Million/uL      Hemoglobin 11.4 g/dL      Hematocrit 33.6 %      MCV 85 fL      MCH 28.9 pg      MCHC 33.9 g/dL      RDW 14.0 %      MPV 9.7 fL      Platelets 266 Thousands/uL      nRBC 0 /100 WBCs      Segmented % 80 %      Immature Grans % 1 %      Lymphocytes % 12 %      Monocytes % 5 %      Eosinophils Relative 2 %      Basophils Relative 0 %      Absolute Neutrophils 7.93 Thousands/µL      Absolute Immature Grans 0.12 Thousand/uL      Absolute Lymphocytes 1.14 Thousands/µL      Absolute Monocytes 0.46 Thousand/µL      Eosinophils Absolute 0.20 Thousand/µL      Basophils Absolute 0.03 Thousands/µL                    No orders to display              Procedures  ECG 12 Lead Documentation Only    Date/Time: 5/9/2024 10:27 AM    Performed by: Teresa Angela  GM  Authorized by: Teresa Angela PA-C    Indications / Diagnosis:  Tachycardia  ECG reviewed by me, the ED Provider: yes    Patient location:  ED  Previous ECG:     Previous ECG:  Compared to current    Comparison ECG info:  5/9/2024    Similarity:  No change    Comparison to cardiac monitor: Yes    Interpretation:     Interpretation: normal    Rate:     ECG rate:  69    ECG rate assessment: normal    Rhythm:     Rhythm: sinus rhythm    Comments:      No acute ischemic changes           ED Course  ED Course as of 05/09/24 1239   Thu May 09, 2024   1057 Cbc unremarkable   1140 STREP A PCR: Not Detected   1141 Potassium(!): 3.2  Slightly low, will replace   1146 Viral panel   1225 UA shows no UTI                               SBIRT 22yo+      Flowsheet Row Most Recent Value   Initial Alcohol Screen: US AUDIT-C     1. How often do you have a drink containing alcohol? 0 Filed at: 05/09/2024 1021   2. How many drinks containing alcohol do you have on a typical day you are drinking?  0 Filed at: 05/09/2024 1021   3b. FEMALE Any Age, or MALE 65+: How often do you have 4 or more drinks on one occassion? 0 Filed at: 05/09/2024 1021   Audit-C Score 0 Filed at: 05/09/2024 1021   SATISH: How many times in the past year have you...    Used an illegal drug or used a prescription medication for non-medical reasons? Never Filed at: 05/09/2024 1021                      Medical Decision Making  Pt here with uri sx/nasal congestion  Labs reassuring, no signs of bacterial infection,will continue to treat symptoms, list of medications safe in pregnancy given to patient.  Patient is good follow-up with PCP, OB/GYN    Amount and/or Complexity of Data Reviewed  External Data Reviewed: labs and notes.     Details: A+ blood type  Labs: ordered. Decision-making details documented in ED Course.    Risk  OTC drugs.  Prescription drug management.             Disposition  Final diagnoses:   Rhinosinusitis     Time reflects when diagnosis was  documented in both MDM as applicable and the Disposition within this note       Time User Action Codes Description Comment    5/9/2024 12:36 PM Teresa Angela Add [J32.9] Rhinosinusitis           ED Disposition       ED Disposition   Discharge    Condition   Stable    Date/Time   Thu May 9, 2024 1236    Comment   Zahra Herman discharge to home/self care.                   Follow-up Information       Follow up With Specialties Details Why Contact Info    Your PCP                Patient's Medications   Discharge Prescriptions    No medications on file       No discharge procedures on file.    PDMP Review       None            ED Provider  Electronically Signed by             Teresa Angela PA-C  05/09/24 4315

## 2024-05-09 NOTE — DISCHARGE INSTRUCTIONS
Use Tylenol every 4 - 6 hours for pain or fever.    You can use over-the-counter antihistamines like Claritin, Zyrtec, Allegra with Flonase for nasal congestion symptoms OR check the lists from your doctor and from ED that were provided to you.  Benadryl at night might help with symptoms.    Follow-up with your doctor in next few days.

## 2024-05-10 LAB
ATRIAL RATE: 113 BPM
BACTERIA UR CULT: NORMAL
P AXIS: 63 DEGREES
PR INTERVAL: 140 MS
QRS AXIS: 75 DEGREES
QRSD INTERVAL: 86 MS
QT INTERVAL: 312 MS
QTC INTERVAL: 427 MS
T WAVE AXIS: 42 DEGREES
VENTRICULAR RATE: 113 BPM

## 2024-05-10 PROCEDURE — 93010 ELECTROCARDIOGRAM REPORT: CPT | Performed by: INTERNAL MEDICINE

## 2024-05-21 NOTE — PROGRESS NOTES
OB/GYN  PN Visit  Zahra Herman  [id number]  2024  2:02 PM  HANS Montelongo    S: 38 y.o.  24w4d here for PN visit. Pregnancy complicated by AMA.     OB complaints:  Denies c/o n/v/ha, no edema, no smoking, no DV.   No vb/lof  No cramping/ctxns or signs of PTL.    She reports low pelvic cramping (rating 3/10) that started this morning and can cause some belly tightness. Not timeable. No VB/No LOF. She reports good fetal movement. Denies urinary symptoms. Denies fever/chills. Denies vaginal symptoms: discharge/odor/irritation. She reports staying hydrated; no recent strenuous activity. She is experiencing the pelvic cramping.    O:    Pre- Vitals      Flowsheet Row Most Recent Value   Prenatal Assessment    Fetal Heart Rate 145   Fundal Height (cm) 25 cm   Movement Present   Prenatal Vitals    Blood Pressure 120/74   Weight - Scale 63.5 kg (140 lb)   Urine Albumin/Glucose    Dilation/Effacement/Station    Cervical Dilation .5   Fetal Station -3   Vaginal Drainage    Edema                 Gen: no acute distress, nonlabored breathing.  OB exam completed: fundal height, +FHT.  Urine: -/-    A/P:  #1. 24w4d GESTATION  Labor precautions reviewed  Reviewed appropriate fetal movement.  Labs UTD; A+  Third tri labs given.  SVE completed; patient's cervix feels thick. External os 0.5cm; internal feels closed. Reviewed exam and concerns with on call physician, Dr. Hoffman. POC to send [facility] L&D for PTL workup. Patient agreeable.       RTC in 4 weeks    HANS Montelongo  2024  2:02 PM

## 2024-05-23 ENCOUNTER — ROUTINE PRENATAL (OUTPATIENT)
Dept: OBGYN CLINIC | Facility: CLINIC | Age: 39
End: 2024-05-23
Payer: COMMERCIAL

## 2024-05-23 ENCOUNTER — HOSPITAL ENCOUNTER (OUTPATIENT)
Facility: HOSPITAL | Age: 39
Discharge: HOME | End: 2024-05-23
Attending: OBSTETRICS & GYNECOLOGY | Admitting: OBSTETRICS & GYNECOLOGY
Payer: COMMERCIAL

## 2024-05-23 VITALS
DIASTOLIC BLOOD PRESSURE: 77 MMHG | TEMPERATURE: 98.2 F | HEART RATE: 76 BPM | RESPIRATION RATE: 16 BRPM | SYSTOLIC BLOOD PRESSURE: 119 MMHG

## 2024-05-23 VITALS
HEIGHT: 63 IN | WEIGHT: 140 LBS | DIASTOLIC BLOOD PRESSURE: 74 MMHG | SYSTOLIC BLOOD PRESSURE: 120 MMHG | BODY MASS INDEX: 24.8 KG/M2

## 2024-05-23 DIAGNOSIS — Z34.92 SECOND TRIMESTER PREGNANCY: Primary | ICD-10-CM

## 2024-05-23 PROBLEM — N94.89 UTERINE CRAMPING: Status: ACTIVE | Noted: 2024-05-23

## 2024-05-23 LAB
ALBUMIN SERPL BCP-MCNC: 3.6 G/DL (ref 3.5–5)
ALP SERPL-CCNC: 67 U/L (ref 34–104)
ALT SERPL W P-5'-P-CCNC: 10 U/L (ref 7–52)
ANION GAP SERPL CALCULATED.3IONS-SCNC: 9 MMOL/L (ref 4–13)
AST SERPL W P-5'-P-CCNC: 11 U/L (ref 13–39)
BILIRUB SERPL-MCNC: 0.24 MG/DL (ref 0.2–1)
BILIRUB UR QL STRIP: NEGATIVE
BUN SERPL-MCNC: 9 MG/DL (ref 5–25)
CALCIUM SERPL-MCNC: 8.7 MG/DL (ref 8.4–10.2)
CHLORIDE SERPL-SCNC: 106 MMOL/L (ref 96–108)
CLARITY UR: CLEAR
CO2 SERPL-SCNC: 21 MMOL/L (ref 21–32)
COLOR UR: NORMAL
CREAT SERPL-MCNC: 0.61 MG/DL (ref 0.6–1.3)
ERYTHROCYTE [DISTWIDTH] IN BLOOD BY AUTOMATED COUNT: 14.4 % (ref 11.6–15.1)
GFR SERPL CREATININE-BSD FRML MDRD: 113 ML/MIN/1.73SQ M
GLUCOSE SERPL-MCNC: 123 MG/DL (ref 65–140)
GLUCOSE UR STRIP-MCNC: NEGATIVE MG/DL
HCT VFR BLD AUTO: 33.4 % (ref 34.8–46.1)
HGB BLD-MCNC: 11.2 G/DL (ref 11.5–15.4)
HGB UR QL STRIP.AUTO: NEGATIVE
KETONES UR STRIP-MCNC: NEGATIVE MG/DL
LEUKOCYTE ESTERASE UR QL STRIP: NEGATIVE
MCH RBC QN AUTO: 29.5 PG (ref 26.8–34.3)
MCHC RBC AUTO-ENTMCNC: 33.5 G/DL (ref 31.4–37.4)
MCV RBC AUTO: 88 FL (ref 82–98)
NITRITE UR QL STRIP: NEGATIVE
PH UR STRIP.AUTO: 6.5 [PH]
PLATELET # BLD AUTO: 255 THOUSANDS/UL (ref 149–390)
PMV BLD AUTO: 9.9 FL (ref 8.9–12.7)
POTASSIUM SERPL-SCNC: 3.7 MMOL/L (ref 3.5–5.3)
PROT SERPL-MCNC: 6.9 G/DL (ref 6.4–8.4)
PROT UR STRIP-MCNC: NEGATIVE MG/DL
RBC # BLD AUTO: 3.8 MILLION/UL (ref 3.81–5.12)
SODIUM SERPL-SCNC: 136 MMOL/L (ref 135–147)
SP GR UR STRIP.AUTO: 1.01 (ref 1–1.03)
UROBILINOGEN UR STRIP-ACNC: <2 MG/DL
WBC # BLD AUTO: 11.55 THOUSAND/UL (ref 4.31–10.16)

## 2024-05-23 PROCEDURE — 99214 OFFICE O/P EST MOD 30 MIN: CPT

## 2024-05-23 PROCEDURE — 99213 OFFICE O/P EST LOW 20 MIN: CPT

## 2024-05-23 PROCEDURE — NC001 PR NO CHARGE: Performed by: OBSTETRICS & GYNECOLOGY

## 2024-05-23 PROCEDURE — 85027 COMPLETE CBC AUTOMATED: CPT

## 2024-05-23 PROCEDURE — 76817 TRANSVAGINAL US OBSTETRIC: CPT | Performed by: OBSTETRICS & GYNECOLOGY

## 2024-05-23 PROCEDURE — 76817 TRANSVAGINAL US OBSTETRIC: CPT

## 2024-05-23 PROCEDURE — 96360 HYDRATION IV INFUSION INIT: CPT

## 2024-05-23 PROCEDURE — 81003 URINALYSIS AUTO W/O SCOPE: CPT

## 2024-05-23 PROCEDURE — 80053 COMPREHEN METABOLIC PANEL: CPT

## 2024-05-23 PROCEDURE — 59025 FETAL NON-STRESS TEST: CPT

## 2024-05-23 RX ADMIN — SODIUM CHLORIDE, SODIUM LACTATE, POTASSIUM CHLORIDE, AND CALCIUM CHLORIDE 1000 ML: .6; .31; .03; .02 INJECTION, SOLUTION INTRAVENOUS at 15:27

## 2024-05-23 NOTE — PROGRESS NOTES
L&D Triage Note - OB/GYN  Zahra Herman 38 y.o. female MRN: [id number]  Unit/Bed#:  TRIAGE 2 Encounter: [id number]    Patient is seen by [facility]    ASSESSMENT  Zahra Herman is a 38 y.o.  at 24w3d presenting from prenatal visit for evaluation for  labor. She reports feeling period-like cramping since this morning. She hasn't been timing how frequently she is experiencing them. They have slightly improved after IV fluids.      PLAN  #1. Rule out PTL:   Speculum exam unremarkable  Microscopy negative  Cervical length: 3.03 cm  SVE 0.5/0/-3, unchanged after 3 hours  S/p IV fluids  CBC/CMP wnl  UA shows no evidence of UTI  Given strict call/return precautions    Discharge instructions  Patient instructed to call if experiencing worsening contractions, vaginal bleeding, loss of fluid or decreased fetal movement.  Will follow up with OBGYN on 24.    D/w Dr. Hoffman  ______________    SUBJECTIVE    ALDAIR: Estimated Date of Delivery: 24    HPI:  38 y.o.  24w3d presents with complaint of abdominal cramping that started this morning. She was seen in the office and cervical exam was noted to be 0.5/0/-3. She was sent for a  labor workup. Here, she is mayra every 2 minutes but just feeling cramping. Reports drinking 5-6 bottles of water a day and denies recent strenuous activity. She was more active last week. Denies dysuria, hematuria, changes in vaginal discharge, chest pain, SOB, headache, vision changes, LE pain or swelling.      Contractions: reports cramping that has been going on since this morning  Leakage of fluid: denies  Vaginal Bleeding: denies  Fetal movement: present    Her obstetrical history is significant for 3 prior vaginal deliveries    ROS:  Constitutional: Negative  Respiratory: Negative  Cardiovascular: Negative    Gastrointestinal: Negative    OBJECTIVE:    Vitals:  /77 (BP Location: Right arm)   Pulse 76   Temp 98.2 °F (36.8 °C) (Oral)    Resp 16   LMP 12/04/2023   There is no height or weight on file to calculate BMI.    Physical Exam  Vitals reviewed. Exam conducted with a chaperone present.   Constitutional:       General: She is not in acute distress.  HENT:      Head: Normocephalic.      Mouth/Throat:      Pharynx: Oropharynx is clear.   Eyes:      General: No scleral icterus.     Conjunctiva/sclera: Conjunctivae normal.   Cardiovascular:      Rate and Rhythm: Normal rate.   Pulmonary:      Effort: Pulmonary effort is normal.   Abdominal:      Palpations: Abdomen is soft.      Tenderness: There is no abdominal tenderness. There is no guarding.   Musculoskeletal:         General: No tenderness.      Right lower leg: No edema.      Left lower leg: No edema.   Skin:     General: Skin is warm and dry.      Coloration: Skin is not jaundiced.   Neurological:      Mental Status: She is alert.   Psychiatric:         Mood and Affect: Mood normal.         Behavior: Behavior normal.         Speculum exam:  Normal external female genitalia  Cervix fully visualized and not visibly dilated  Physiologic discharge  No bleeding, pooling, abnormal discharge, or lesions noted    Microscopy:     Infection:   - No clue cells    - No hyphae   - No trichomonads present    Membrane status   - No ferning   - Negative nitrazene   - No pooling     SVE:  0.5/0/-3    FHT:  Baseline Rate (FHR): 150 bpm  Variability: Moderate  Accelerations: 10 x 10 (<32 weeks)  Decelerations: None    TOCO:   Contraction Frequency (minutes): 1-5  Contraction Duration (seconds): 30-60  Contraction Intensity: Mild    IMAGING:       TVUS   Cervical length         - 3.20 cm         - 3.03 cm         - 3.22 cm   Presentation: vertex          Labs:   Recent Results (from the past 24 hour(s))   CBC and Platelet    Collection Time: 05/23/24  3:22 PM   Result Value Ref Range    WBC 11.55 (H) 4.31 - 10.16 Thousand/uL    RBC 3.80 (L) 3.81 - 5.12 Million/uL    Hemoglobin 11.2 (L) 11.5 - 15.4 g/dL     Hematocrit 33.4 (L) 34.8 - 46.1 %    MCV 88 82 - 98 fL    MCH 29.5 26.8 - 34.3 pg    MCHC 33.5 31.4 - 37.4 g/dL    RDW 14.4 11.6 - 15.1 %    Platelets 255 149 - 390 Thousands/uL    MPV 9.9 8.9 - 12.7 fL   Comprehensive metabolic panel    Collection Time: 05/23/24  3:22 PM   Result Value Ref Range    Sodium 136 135 - 147 mmol/L    Potassium 3.7 3.5 - 5.3 mmol/L    Chloride 106 96 - 108 mmol/L    CO2 21 21 - 32 mmol/L    ANION GAP 9 4 - 13 mmol/L    BUN 9 5 - 25 mg/dL    Creatinine 0.61 0.60 - 1.30 mg/dL    Glucose 123 65 - 140 mg/dL    Calcium 8.7 8.4 - 10.2 mg/dL    AST 11 (L) 13 - 39 U/L    ALT 10 7 - 52 U/L    Alkaline Phosphatase 67 34 - 104 U/L    Total Protein 6.9 6.4 - 8.4 g/dL    Albumin 3.6 3.5 - 5.0 g/dL    Total Bilirubin 0.24 0.20 - 1.00 mg/dL    eGFR 113 ml/min/1.73sq m   UA w Reflex to Microscopic w Reflex to Culture    Collection Time: 05/23/24  4:15 PM    Specimen: Urine, Clean Catch   Result Value Ref Range    Color, UA Light Yellow     Clarity, UA Clear     Specific Gravity, UA 1.011 1.003 - 1.030    pH, UA 6.5 4.5, 5.0, 5.5, 6.0, 6.5, 7.0, 7.5, 8.0    Leukocytes, UA Negative Negative    Nitrite, UA Negative Negative    Protein, UA Negative Negative mg/dl    Glucose, UA Negative Negative mg/dl    Ketones, UA Negative Negative mg/dl    Urobilinogen, UA <2.0 <2.0 mg/dl mg/dl    Bilirubin, UA Negative Negative    Occult Blood, UA Negative Negative           Sita Mack MD  5/23/2024  4:10 PM

## 2024-05-23 NOTE — PROCEDURES
Zahra Herman, a  at 24w3d with an ALDAIR of 2024, by Last Menstrual Period, [facility] for the following procedure(s): $Procedure Type: NST, US - Transvaginal]    Nonstress Test  Reason for NST: Routine  Variability: Moderate  Decelerations: None  Accelerations: Yes  Acoustic Stimulator: No  Baseline: 150 BPM  Uterine Irritability: Yes  Contractions: Regular  Contraction Frequency (minutes): 5 min      Ultrasound Other  Fetal Presentation: Vertex  Cervical Length: 3.03  Funnel: No  Debris: No  Placenta Previa: No  Vasa Previa: No          Interpretation  Nonstress Test Interpretation: Reactive  Overall Impression: Reassuring    Ultrasound Probe Disinfection    A transvaginal ultrasound was performed.   Prior to use, disinfection was performed with High Level Disinfection Process (Trophon)  Probe serial number [id number]:  [id number] was used    Sita Mack MD  24  5:28 PM

## 2024-05-25 LAB — HOLD SPECIMEN: NORMAL

## 2024-06-18 ENCOUNTER — TELEPHONE (OUTPATIENT)
Dept: OBGYN CLINIC | Facility: CLINIC | Age: 39
End: 2024-06-18

## 2024-06-18 NOTE — TELEPHONE ENCOUNTER
LMOM for Patient to call us back to reschedule OB 36 wk appointment on 8/13 in [place] office due to provider not being available in office that day.

## 2024-06-20 ENCOUNTER — ROUTINE PRENATAL (OUTPATIENT)
Dept: OBGYN CLINIC | Facility: CLINIC | Age: 39
End: 2024-06-20
Payer: COMMERCIAL

## 2024-06-20 VITALS
HEIGHT: 63 IN | WEIGHT: 145 LBS | BODY MASS INDEX: 25.69 KG/M2 | SYSTOLIC BLOOD PRESSURE: 112 MMHG | DIASTOLIC BLOOD PRESSURE: 72 MMHG

## 2024-06-20 DIAGNOSIS — Z3A.28 28 WEEKS GESTATION OF PREGNANCY: ICD-10-CM

## 2024-06-20 DIAGNOSIS — O09.513 PRIMIGRAVIDA OF ADVANCED MATERNAL AGE IN THIRD TRIMESTER: Primary | ICD-10-CM

## 2024-06-20 LAB
DME PARACHUTE DELIVERY DATE REQUESTED: NORMAL
DME PARACHUTE ITEM DESCRIPTION: NORMAL
DME PARACHUTE ORDER STATUS: NORMAL
DME PARACHUTE SUPPLIER NAME: NORMAL
DME PARACHUTE SUPPLIER PHONE: NORMAL

## 2024-06-20 PROCEDURE — 99213 OFFICE O/P EST LOW 20 MIN: CPT | Performed by: STUDENT IN AN ORGANIZED HEALTH CARE EDUCATION/TRAINING PROGRAM

## 2024-06-20 NOTE — PROGRESS NOTES
Zahra is a 38-year-old -0-1-3 at 28 weeks and 3 days presenting for routine prenatal care- she denies any contractions, loss of fluid or vaginal bleeding.  She endorses good fetal movement.   Pregnancy is complicated by history of AMA, fetal urinary tract dilatation and 1 elevated blood pressure on 2024.  Encourage patient to complete her third trimester labs which have not yet been completed.  A breast pump has been ordered for her per her request.  Reviewed Tdap which patient was amenable to but left before given-please reoffer at next visit.  We discussed contraception-she does report that she is family complete.  We did review contraception including permanent sterilization which I reviewed is a permanent and irreversible procedure-LARCs and vasectomy which are just as effective for contraceptive purposes.  Patient is not yet sure which she would like to do and will continue to consider-please revisit at future appointments.  Has 32-week growth scan scheduled-reviewed fetal kick counts today.  Return to office in 2 weeks or sooner if needed.

## 2024-06-20 NOTE — PATIENT INSTRUCTIONS
Pregnancy at 27 to 30 Weeks   AMBULATORY CARE:   What changes are happening to your body:  You may notice new symptoms such as shortness of breath, heartburn, or swelling of your ankles and feet. You may also have trouble sleeping or contractions.  Seek care immediately if:   You develop a severe headache that does not go away.    You have new or increased vision changes, such as blurred or spotted vision.    You have new or increased swelling in your face or hands.    You have vaginal spotting or bleeding.    Your water broke or you feel warm water gushing or trickling from your vagina.    Call your doctor or obstetrician if:   You have more than 5 contractions in 1 hour.    You notice any changes in your baby's movements.    You have abdominal cramps, pressure, or tightening.    You have a change in vaginal discharge.    You have chills or a fever.    You have vaginal itching, burning, or pain.    You have yellow, green, white, or foul-smelling vaginal discharge.    You have pain or burning when you urinate, less urine than usual, or pink or bloody urine.    You have questions or concerns about your condition or care.    How to care for yourself at this stage of your pregnancy:       Eat a variety of healthy foods.  Healthy foods include fruits, vegetables, whole-grain breads, low-fat dairy foods, beans, lean meats, and fish. Drink liquids as directed. Ask how much liquid to drink each day and which liquids are best for you. Limit caffeine to less than 200 milligrams each day. Limit your intake of fish to 2 servings each week. Choose fish low in mercury such as canned light tuna, shrimp, salmon, cod, or tilapia. Do not  eat fish high in mercury such as swordfish, tilefish, maribel mackerel, and shark.         Manage heartburn  by eating 4 or 5 small meals each day instead of large meals. Avoid spicy food.         Manage swelling  by lying down and putting your feet up.         Take prenatal vitamins as directed.   Your need for certain vitamins and minerals, such as folic acid, increases during pregnancy. Prenatal vitamins provide some of the extra vitamins and minerals you need. Prenatal vitamins may also help to decrease the risk of certain birth defects.         Talk to your healthcare provider about exercise.  Moderate exercise can help you stay fit. Your healthcare provider will help you plan an exercise program that is safe for you during pregnancy.         Do not smoke.  Smoking increases your risk of a miscarriage and other health problems during your pregnancy. Smoking can cause your baby to be born too early or weigh less at birth. Ask your healthcare provider for information if you need help quitting.    Do not drink alcohol.  Alcohol passes from your body to your baby through the placenta. It can affect your baby's brain development and cause fetal alcohol syndrome (FAS). FAS is a group of conditions that causes mental, behavior, and growth problems.    Talk to your healthcare provider before you take any medicines.  Many medicines may harm your baby if you take them when you are pregnant. Do not take any medicines, vitamins, herbs, or supplements without first talking to your healthcare provider. Never use illegal or street drugs (such as marijuana or cocaine) while you are pregnant.  Safety tips during pregnancy:   Avoid hot tubs and saunas.  Do not use a hot tub or sauna while you are pregnant, especially during your first trimester. Hot tubs and saunas may raise your baby's temperature and increase the risk of birth defects.    Avoid toxoplasmosis.  This is an infection caused by eating raw meat or being around infected cat feces. It can cause birth defects, miscarriages, and other problems. Wash your hands after you touch raw meat. Make sure any meat is well-cooked before you eat it. Avoid raw eggs and unpasteurized milk. Use gloves or ask someone else to clean your cat's litter box while you are pregnant.        Changes that are happening with your baby:  By 30 weeks, your baby may weigh more than 3 pounds. Your baby may be about 11 inches long from the top of the head to the rump (baby's bottom). Your baby's eyes open and close now. Your baby's kicks and movements are more forceful at this time.  What you need to know about prenatal care:  Your healthcare provider will check your blood pressure and weight. You may also need the following:  Blood tests  may be done to check for anemia or blood type.    A urine test  may also be done to check for sugar and protein. These can be signs of gestational diabetes or infection. Protein in your urine may also be a sign of preeclampsia. Preeclampsia is a condition that can develop during week 20 or later of your pregnancy. It causes high blood pressure, and it can cause problems with your kidneys and other organs.    A Tdap vaccine and flu vaccine  may be recommended by your healthcare provider.    A gestational diabetes screen  may be done. Your healthcare provider may order either a 1-step or 2-step oral glucose tolerance test (OGTT).     1-step OGTT:  Your blood sugar level will be tested after you have not eaten for 8 hours (fasting). You will then be given a glucose drink. Your level will be tested again 1 hour and 2 hours after you finish the drink.    2-step OGTT:  You do not have to fast for the first part of the test. You will have the glucose drink at any time of day. Your blood sugar level will be checked 1 hour later. If your blood sugar is higher than a certain level, another test will be ordered. You will fast and your blood sugar level will be tested. You will have the glucose drink. Your blood will be tested again 1 hour, 2 hours, and 3 hours after you finish the glucose drink.    Fundal height  is a measurement of your uterus to check your baby's growth. This number is usually the same as the number of weeks that you have been pregnant. Your healthcare provider  may also check your baby's position.    Your baby's heart rate  will be checked.    Follow up with your doctor or obstetrician as directed:  Write down your questions so you remember to ask them during your visits.  © Copyright [employer] 2023 Information is for End User's use only and may not be sold, redistributed or otherwise used for commercial purposes.  The above information is an  only. It is not intended as medical advice for individual conditions or treatments. Talk to your doctor, nurse or pharmacist before following any medical regimen to see if it is safe and effective for you.

## 2024-07-01 ENCOUNTER — ROUTINE PRENATAL (OUTPATIENT)
Dept: OBGYN CLINIC | Facility: CLINIC | Age: 39
End: 2024-07-01
Payer: COMMERCIAL

## 2024-07-01 VITALS — BODY MASS INDEX: 25.69 KG/M2 | WEIGHT: 145 LBS | SYSTOLIC BLOOD PRESSURE: 120 MMHG | DIASTOLIC BLOOD PRESSURE: 80 MMHG

## 2024-07-01 DIAGNOSIS — Z23 ENCOUNTER FOR IMMUNIZATION: ICD-10-CM

## 2024-07-01 DIAGNOSIS — N94.89 UTERINE CRAMPING: ICD-10-CM

## 2024-07-01 DIAGNOSIS — Z34.93 PRENATAL CARE IN THIRD TRIMESTER: Primary | ICD-10-CM

## 2024-07-01 PROCEDURE — 90715 TDAP VACCINE 7 YRS/> IM: CPT | Performed by: STUDENT IN AN ORGANIZED HEALTH CARE EDUCATION/TRAINING PROGRAM

## 2024-07-01 PROCEDURE — 99213 OFFICE O/P EST LOW 20 MIN: CPT | Performed by: STUDENT IN AN ORGANIZED HEALTH CARE EDUCATION/TRAINING PROGRAM

## 2024-07-01 PROCEDURE — 90471 IMMUNIZATION ADMIN: CPT | Performed by: STUDENT IN AN ORGANIZED HEALTH CARE EDUCATION/TRAINING PROGRAM

## 2024-07-01 PROCEDURE — G9920 SCRNING PERF AND NEGATIVE: HCPCS | Performed by: STUDENT IN AN ORGANIZED HEALTH CARE EDUCATION/TRAINING PROGRAM

## 2024-07-01 NOTE — PROGRESS NOTES
Zahra is a 40 yo  at 30 weeks gestation presenting for routine prenatal care- she denies any contractions, cramping, loss of fluid or vaginal bleeding.  She endorses good fetal movement.  Pregnancy is complicated by AMA and fetal urinary tract dilation.  Patient not scheduled for 32-week growth scan and a referral for maternal-fetal medicine was placed today and I encouraged her to schedule soon as possible.  Tdap reviewed today and given in office.  30-week packet given and reviewed-delivery consent reviewed and signed today.  Return to office in 2 weeks or sooner if needed.

## 2024-07-13 NOTE — PROGRESS NOTES
OB/GYN  PN Visit  Zahra Herman  [id number]  7/15/2024  5:06 PM  Dr. Azra Regalado MD    S: 39 y.o.  32w0d here for PN visit. She denies contractions. She denies leakage of fluid and vaginal bleeding. She reports good fetal movement. She denies nausea, vomiting, headache, cramping, edema, domestic violence, and smoking. Her pregnancy is complicated by AMA, fetal pyelectasis and fibroids.     O:  Pre- Vitals      Flowsheet Row Most Recent Value   Prenatal Assessment    Fetal Heart Rate 140   Fundal Height (cm) 32 cm   Movement Present   Prenatal Vitals    Blood Pressure 120/86   Weight - Scale 65.8 kg (145 lb)   Urine Albumin/Glucose    Dilation/Effacement/Station    Vaginal Drainage    Draining Fluid No   Edema    LLE Edema None   RLE Edema None          Physical Exam  Vitals reviewed.   Constitutional:       General: She is not in acute distress.     Appearance: Normal appearance. She is well-developed. She is not ill-appearing, toxic-appearing or diaphoretic.   Cardiovascular:      Rate and Rhythm: Normal rate.   Pulmonary:      Effort: Pulmonary effort is normal. No respiratory distress.   Abdominal:      General: There is no distension.      Palpations: Abdomen is soft. There is no mass.      Tenderness: There is no abdominal tenderness. There is no guarding or rebound.   Genitourinary:     Comments: Gravid, nontender  Skin:     General: Skin is warm and dry.   Neurological:      Mental Status: She is alert and oriented to person, place, and time.   Psychiatric:         Mood and Affect: Mood normal.         Behavior: Behavior normal.         A/P:    Problem List          Unprioritized    Pyelectasis of fetus on prenatal ultrasound    Overview     From [facility]:   - We discussed genetic screening and testing. Her a-priori risk is 1 in 65 due to age of 38. Her risk with isolated pyelectasis is estimated to be higher at around 1 in 32. After discussion of risks/benefits/alternatives of genetic  screening and/or testing she declines aneuploidy screening to further delineate her risk  - Follow up at 30-32 weeks for growth and repeat evaluation of urinary tract         AMA (advanced maternal age) multigravida 35+, third trimester    Overview     Recommended 32 week growth  NST/ CASSY at 36 weeks  Delivery at or before EDC         31 weeks gestation of pregnancy    Current Assessment & Plan     - Continue PNV  - Labor precautions reviewed  - Fetal kick counts reviewed  - Labs: Encouraged to complete 3rd trimester labs  - Genetics: None completed  - Ultrasounds: Most recent US wnl on 24 with the exception of UTD; Next US scheduled for 24 (She would like to confirm fetal sex, position, and weight)  - Tdap: Administered 24  - Flu Shot: Will offer in season  - RSV:  Will offer during season (-) @ [id number]   - COVID: Vaccinated  - Rhogam: N/A  - Delivery:  with epidural  - Contraception: Patient reports that her family is complete. Reviewed sterilization (either male or female) and LARC previously. Recommended either Mirena or Paragard for length of efficacy.   - Breastfeeding: Yes; pump ordered previously  - Pediatrician: TBD  - RTO in 2 weeks         Elevated blood pressure reading without diagnosis of hypertension    Overview     Elevated BP on 24         Uterine fibroid in pregnancy    Current Assessment & Plan     1. Intramural myometrium fibroid located in the middle anterior corpus region, measuring 2.8 x 1.7 x 2.1cm with a volume of 5.3cc. Color doppler flow was not increased. The appearance was hypoechoic.     2. Intramural myometrium fibroid located in the right lateral corpus region, measuring 2.1 x 1.8 x 2.7cm with a volume of 5.3cc. Color doppler flow was not increased. The appearance was hypoechoic.              Future Appointments   Date Time Provider Department Center   2024  2:00 PM  US 4 [facility]   2024  3:30 PM Lisa Bray  MD Miki [facility]   8/13/2024 11:00 AM HANS Montelongo [facility]   8/19/2024 10:15 AM Lisa Livingston MD [facility]   8/27/2024  3:45 PM Azra Regalado MD [facility]   9/3/2024  2:30 PM Denia Barboza PA-C [facility]   9/9/2024 12:45 PM Azra Regalado MD [facility]         Azra Regalado MD  7/15/2024  5:06 PM

## 2024-07-15 ENCOUNTER — ROUTINE PRENATAL (OUTPATIENT)
Dept: OBGYN CLINIC | Facility: CLINIC | Age: 39
End: 2024-07-15
Payer: COMMERCIAL

## 2024-07-15 VITALS
HEIGHT: 63 IN | DIASTOLIC BLOOD PRESSURE: 86 MMHG | SYSTOLIC BLOOD PRESSURE: 120 MMHG | WEIGHT: 145 LBS | BODY MASS INDEX: 25.69 KG/M2

## 2024-07-15 DIAGNOSIS — D25.9 UTERINE FIBROID IN PREGNANCY: ICD-10-CM

## 2024-07-15 DIAGNOSIS — O09.523 AMA (ADVANCED MATERNAL AGE) MULTIGRAVIDA 35+, THIRD TRIMESTER: ICD-10-CM

## 2024-07-15 DIAGNOSIS — O35.EXX0 PYELECTASIS OF FETUS ON PRENATAL ULTRASOUND: ICD-10-CM

## 2024-07-15 DIAGNOSIS — O34.10 UTERINE FIBROID IN PREGNANCY: ICD-10-CM

## 2024-07-15 DIAGNOSIS — Z3A.31 31 WEEKS GESTATION OF PREGNANCY: Primary | ICD-10-CM

## 2024-07-15 DIAGNOSIS — R03.0 ELEVATED BLOOD PRESSURE READING WITHOUT DIAGNOSIS OF HYPERTENSION: ICD-10-CM

## 2024-07-15 PROBLEM — N94.89 UTERINE CRAMPING: Status: RESOLVED | Noted: 2024-05-23 | Resolved: 2024-07-15

## 2024-07-15 PROCEDURE — 99213 OFFICE O/P EST LOW 20 MIN: CPT | Performed by: OBSTETRICS & GYNECOLOGY

## 2024-07-15 NOTE — ASSESSMENT & PLAN NOTE
1. Intramural myometrium fibroid located in the middle anterior corpus region, measuring 2.8 x 1.7 x 2.1cm with a volume of 5.3cc. Color doppler flow was not increased. The appearance was hypoechoic.     2. Intramural myometrium fibroid located in the right lateral corpus region, measuring 2.1 x 1.8 x 2.7cm with a volume of 5.3cc. Color doppler flow was not increased. The appearance was hypoechoic.

## 2024-07-15 NOTE — ASSESSMENT & PLAN NOTE
- Continue PNV  - Labor precautions reviewed  - Fetal kick counts reviewed  - Labs: Encouraged to complete 3rd trimester labs  - Genetics: None completed  - Ultrasounds: Most recent US wnl on 24 with the exception of UTD; Next US scheduled for 24 (She would like to confirm fetal sex, position, and weight)  - Tdap: Administered 24  - Flu Shot: Will offer in season  - RSV:  Will offer during season (-) @ [id number]   - COVID: Vaccinated  - Rhogam: N/A  - Delivery:  with epidural  - Contraception: Patient reports that her family is complete. Reviewed sterilization (either male or female) and LARC previously. Recommended either Mirena or Paragard for length of efficacy.   - Breastfeeding: Yes; pump ordered previously  - Pediatrician: TBD  - RTO in 2 weeks

## 2024-07-17 ENCOUNTER — APPOINTMENT (OUTPATIENT)
Dept: LAB | Facility: AMBULARY SURGERY CENTER | Age: 39
End: 2024-07-17
Payer: COMMERCIAL

## 2024-07-17 DIAGNOSIS — Z34.92 SECOND TRIMESTER PREGNANCY: ICD-10-CM

## 2024-07-17 LAB
BASOPHILS # BLD AUTO: 0.04 THOUSANDS/ÂΜL (ref 0–0.1)
BASOPHILS NFR BLD AUTO: 1 % (ref 0–1)
EOSINOPHIL # BLD AUTO: 0.12 THOUSAND/ÂΜL (ref 0–0.61)
EOSINOPHIL NFR BLD AUTO: 2 % (ref 0–6)
ERYTHROCYTE [DISTWIDTH] IN BLOOD BY AUTOMATED COUNT: 14.1 % (ref 11.6–15.1)
GLUCOSE 1H P 50 G GLC PO SERPL-MCNC: 135 MG/DL (ref 70–134)
HCT VFR BLD AUTO: 36 % (ref 34.8–46.1)
HGB BLD-MCNC: 11.9 G/DL (ref 11.5–15.4)
IMM GRANULOCYTES # BLD AUTO: 0.05 THOUSAND/UL (ref 0–0.2)
IMM GRANULOCYTES NFR BLD AUTO: 1 % (ref 0–2)
LYMPHOCYTES # BLD AUTO: 1.59 THOUSANDS/ÂΜL (ref 0.6–4.47)
LYMPHOCYTES NFR BLD AUTO: 22 % (ref 14–44)
MCH RBC QN AUTO: 29.7 PG (ref 26.8–34.3)
MCHC RBC AUTO-ENTMCNC: 33.1 G/DL (ref 31.4–37.4)
MCV RBC AUTO: 90 FL (ref 82–98)
MONOCYTES # BLD AUTO: 0.57 THOUSAND/ÂΜL (ref 0.17–1.22)
MONOCYTES NFR BLD AUTO: 8 % (ref 4–12)
NEUTROPHILS # BLD AUTO: 5.04 THOUSANDS/ÂΜL (ref 1.85–7.62)
NEUTS SEG NFR BLD AUTO: 66 % (ref 43–75)
NRBC BLD AUTO-RTO: 0 /100 WBCS
PLATELET # BLD AUTO: 264 THOUSANDS/UL (ref 149–390)
PMV BLD AUTO: 10.7 FL (ref 8.9–12.7)
RBC # BLD AUTO: 4.01 MILLION/UL (ref 3.81–5.12)
TREPONEMA PALLIDUM IGG+IGM AB [PRESENCE] IN SERUM OR PLASMA BY IMMUNOASSAY: NORMAL
WBC # BLD AUTO: 7.41 THOUSAND/UL (ref 4.31–10.16)

## 2024-07-17 PROCEDURE — 82950 GLUCOSE TEST: CPT

## 2024-07-17 PROCEDURE — 36415 COLL VENOUS BLD VENIPUNCTURE: CPT

## 2024-07-17 PROCEDURE — 86780 TREPONEMA PALLIDUM: CPT

## 2024-07-17 PROCEDURE — 85025 COMPLETE CBC W/AUTO DIFF WBC: CPT

## 2024-07-18 DIAGNOSIS — O99.810 ABNORMAL MATERNAL GLUCOSE TOLERANCE, ANTEPARTUM: Primary | ICD-10-CM

## 2024-07-24 ENCOUNTER — ULTRASOUND (OUTPATIENT)
Facility: HOSPITAL | Age: 39
End: 2024-07-24
Attending: STUDENT IN AN ORGANIZED HEALTH CARE EDUCATION/TRAINING PROGRAM
Payer: COMMERCIAL

## 2024-07-24 VITALS
BODY MASS INDEX: 25.51 KG/M2 | HEIGHT: 63 IN | WEIGHT: 143.96 LBS | DIASTOLIC BLOOD PRESSURE: 80 MMHG | HEART RATE: 106 BPM | SYSTOLIC BLOOD PRESSURE: 110 MMHG

## 2024-07-24 DIAGNOSIS — Z3A.33 33 WEEKS GESTATION OF PREGNANCY: ICD-10-CM

## 2024-07-24 DIAGNOSIS — O09.523 AMA (ADVANCED MATERNAL AGE) MULTIGRAVIDA 35+, THIRD TRIMESTER: Primary | ICD-10-CM

## 2024-07-24 DIAGNOSIS — Z34.93 PRENATAL CARE IN THIRD TRIMESTER: ICD-10-CM

## 2024-07-24 PROBLEM — O35.EXX0 PYELECTASIS OF FETUS ON PRENATAL ULTRASOUND: Status: RESOLVED | Noted: 2024-04-24 | Resolved: 2024-07-24

## 2024-07-24 PROCEDURE — 99213 OFFICE O/P EST LOW 20 MIN: CPT | Performed by: OBSTETRICS & GYNECOLOGY

## 2024-07-24 PROCEDURE — 76816 OB US FOLLOW-UP PER FETUS: CPT | Performed by: OBSTETRICS & GYNECOLOGY

## 2024-07-24 NOTE — PROGRESS NOTES
The patient was seen today for an ultrasound.  Please see ultrasound report (located under Ob Procedures) for additional details.   Thank you very much for allowing us to participate in the care of this very nice patient.  Should you have any questions, please do not hesitate to contact me.     Girish Hogan MD FACOG  Attending Physician, Maternal-Fetal Medicine  [facility]

## 2024-07-31 ENCOUNTER — ROUTINE PRENATAL (OUTPATIENT)
Dept: OBGYN CLINIC | Facility: CLINIC | Age: 39
End: 2024-07-31
Payer: COMMERCIAL

## 2024-07-31 VITALS
WEIGHT: 147 LBS | SYSTOLIC BLOOD PRESSURE: 116 MMHG | DIASTOLIC BLOOD PRESSURE: 72 MMHG | BODY MASS INDEX: 26.05 KG/M2 | HEIGHT: 63 IN

## 2024-07-31 DIAGNOSIS — Z3A.31 31 WEEKS GESTATION OF PREGNANCY: Primary | ICD-10-CM

## 2024-07-31 DIAGNOSIS — Z34.93 PRENATAL CARE IN THIRD TRIMESTER: ICD-10-CM

## 2024-07-31 PROCEDURE — G9920 SCRNING PERF AND NEGATIVE: HCPCS | Performed by: STUDENT IN AN ORGANIZED HEALTH CARE EDUCATION/TRAINING PROGRAM

## 2024-07-31 PROCEDURE — 99213 OFFICE O/P EST LOW 20 MIN: CPT | Performed by: STUDENT IN AN ORGANIZED HEALTH CARE EDUCATION/TRAINING PROGRAM

## 2024-07-31 NOTE — PROGRESS NOTES
Zahra is a 39 y.o.  34w2d. Reports ++FM, no LOF, VB, or regular contractions.     Vitals:    24 1500   BP: 116/72     S=D  +FHTs    A/P:  Third tri labs notable for elevated 1hr GTT, 3hr GTT ordered but not yet completed  Rh status POS  24: VERTEX; EFW [place] 4 2331 grams - 5 lbs 2 oz (64%) AC 83rd%ile    TDAP vaccine--completed  Contraception: not interested in sterilization   Breastfeeding: yes, pump ordered and not yet received, so parachute order put in again today  Birth plan: 3xSVDs, would like epidural    Discussed pre-term labor precautions  Return to office in 2 weeks

## 2024-08-12 NOTE — PROGRESS NOTES
OB/GYN  PN Visit  Zahra Herman  [id number]  2024  11:15 AM  HANS Montelongo    S: 39 y.o.  36w1d here for PN visit. Pregnancy complicated by AMA, fetal urinary tract dilation, and one elevated BP .     OB complaints:  Denies c/o n/v/ha, no edema, no smoking, no DV.   No vb/lof  No cramping/ctxns or signs of PTL.    She reports very mild cramping on the sides. +good Fetal movement.     O:    Pre- Vitals      Flowsheet Row Most Recent Value   Prenatal Assessment    Fetal Heart Rate 135   Fundal Height (cm) 36 cm   Movement Present   Prenatal Vitals    Blood Pressure 120/86   Weight - Scale 67.1 kg (148 lb)   Urine Albumin/Glucose    Dilation/Effacement/Station    Vaginal Drainage    Edema               Gen: no acute distress, nonlabored breathing.  OB exam completed: fundal height, +FHT.  Urine: -/-    A/P:  Problem List Items Addressed This Visit       AMA (advanced maternal age) multigravida 35+, third trimester    Elevated blood pressure reading without diagnosis of hypertension    Uterine fibroid in pregnancy     Other Visit Diagnoses       Prenatal care in third trimester    -  Primary    Relevant Orders    Strep B DNA probe, amplification          #1. 36w1d GESTATION  Labor precautions reviewed  Fetal kick counts reviewed  Tdap:   Labs indicate abnormal glucola. Advised to complete 3 hr gtt. Reviewed importance of glycemic control in pregnancy to decrease risk of complications in pregnancy and delivery, including fetal macrosomia, shoulder dystocia, fetal/ death, and hypoglycemia in .   Yellow packet given and consents signed .   Breast pump: ordered  Pediatrician: established  Contraception: she is unsure, previously counseled regarding LARC  GBS: collected today     RTC in 4 weeks    HANS Montelongo  2024  11:15 AM

## 2024-08-13 ENCOUNTER — ROUTINE PRENATAL (OUTPATIENT)
Dept: OBGYN CLINIC | Facility: CLINIC | Age: 39
End: 2024-08-13
Payer: COMMERCIAL

## 2024-08-13 VITALS
HEIGHT: 63 IN | SYSTOLIC BLOOD PRESSURE: 120 MMHG | BODY MASS INDEX: 26.22 KG/M2 | WEIGHT: 148 LBS | DIASTOLIC BLOOD PRESSURE: 86 MMHG

## 2024-08-13 DIAGNOSIS — O34.10 UTERINE FIBROID IN PREGNANCY: ICD-10-CM

## 2024-08-13 DIAGNOSIS — O09.523 AMA (ADVANCED MATERNAL AGE) MULTIGRAVIDA 35+, THIRD TRIMESTER: ICD-10-CM

## 2024-08-13 DIAGNOSIS — D25.9 UTERINE FIBROID IN PREGNANCY: ICD-10-CM

## 2024-08-13 DIAGNOSIS — R03.0 ELEVATED BLOOD PRESSURE READING WITHOUT DIAGNOSIS OF HYPERTENSION: ICD-10-CM

## 2024-08-13 DIAGNOSIS — Z34.93 PRENATAL CARE IN THIRD TRIMESTER: Primary | ICD-10-CM

## 2024-08-13 PROCEDURE — 87150 DNA/RNA AMPLIFIED PROBE: CPT

## 2024-08-13 PROCEDURE — 99213 OFFICE O/P EST LOW 20 MIN: CPT

## 2024-08-14 ENCOUNTER — APPOINTMENT (OUTPATIENT)
Dept: LAB | Facility: AMBULARY SURGERY CENTER | Age: 39
End: 2024-08-14
Payer: COMMERCIAL

## 2024-08-14 ENCOUNTER — APPOINTMENT (OUTPATIENT)
Dept: LAB | Facility: CLINIC | Age: 39
End: 2024-08-14
Payer: COMMERCIAL

## 2024-08-14 DIAGNOSIS — O99.810 ABNORMAL MATERNAL GLUCOSE TOLERANCE, ANTEPARTUM: ICD-10-CM

## 2024-08-14 DIAGNOSIS — O09.511 ADVANCED MATERNAL AGE, PRIMIGRAVIDA IN FIRST TRIMESTER, ANTEPARTUM: ICD-10-CM

## 2024-08-14 DIAGNOSIS — Z3A.16 16 WEEKS GESTATION OF PREGNANCY: ICD-10-CM

## 2024-08-14 DIAGNOSIS — Z34.91 PRENATAL CARE IN FIRST TRIMESTER: ICD-10-CM

## 2024-08-14 LAB
ABO GROUP BLD: NORMAL
BACTERIA UR QL AUTO: ABNORMAL /HPF
BASOPHILS # BLD AUTO: 0.05 THOUSANDS/ÂΜL (ref 0–0.1)
BASOPHILS NFR BLD AUTO: 1 % (ref 0–1)
BILIRUB UR QL STRIP: NEGATIVE
BLD GP AB SCN SERPL QL: NEGATIVE
CLARITY UR: ABNORMAL
COLOR UR: ABNORMAL
EOSINOPHIL # BLD AUTO: 0.08 THOUSAND/ÂΜL (ref 0–0.61)
EOSINOPHIL NFR BLD AUTO: 1 % (ref 0–6)
ERYTHROCYTE [DISTWIDTH] IN BLOOD BY AUTOMATED COUNT: 13.3 % (ref 11.6–15.1)
GLUCOSE 1H P 100 G GLC PO SERPL-MCNC: 140 MG/DL (ref 65–179)
GLUCOSE 2H P 100 G GLC PO SERPL-MCNC: 127 MG/DL (ref 65–154)
GLUCOSE 3H P 100 G GLC PO SERPL-MCNC: 90 MG/DL (ref 65–139)
GLUCOSE P FAST SERPL-MCNC: 79 MG/DL (ref 65–94)
GLUCOSE UR STRIP-MCNC: NEGATIVE MG/DL
HBV SURFACE AB SER-ACNC: <3 MIU/ML
HBV SURFACE AG SER QL: NORMAL
HCT VFR BLD AUTO: 37.2 % (ref 34.8–46.1)
HCV AB SER QL: NORMAL
HGB BLD-MCNC: 12.3 G/DL (ref 11.5–15.4)
HGB UR QL STRIP.AUTO: NEGATIVE
HIV 1+2 AB+HIV1 P24 AG SERPL QL IA: NORMAL
HIV 2 AB SERPL QL IA: NORMAL
HIV1 AB SERPL QL IA: NORMAL
HIV1 P24 AG SERPL QL IA: NORMAL
IMM GRANULOCYTES # BLD AUTO: 0.04 THOUSAND/UL (ref 0–0.2)
IMM GRANULOCYTES NFR BLD AUTO: 1 % (ref 0–2)
KETONES UR STRIP-MCNC: NEGATIVE MG/DL
LEUKOCYTE ESTERASE UR QL STRIP: ABNORMAL
LYMPHOCYTES # BLD AUTO: 1.91 THOUSANDS/ÂΜL (ref 0.6–4.47)
LYMPHOCYTES NFR BLD AUTO: 25 % (ref 14–44)
MCH RBC QN AUTO: 29.2 PG (ref 26.8–34.3)
MCHC RBC AUTO-ENTMCNC: 33.1 G/DL (ref 31.4–37.4)
MCV RBC AUTO: 88 FL (ref 82–98)
MONOCYTES # BLD AUTO: 0.48 THOUSAND/ÂΜL (ref 0.17–1.22)
MONOCYTES NFR BLD AUTO: 6 % (ref 4–12)
NEUTROPHILS # BLD AUTO: 5.07 THOUSANDS/ÂΜL (ref 1.85–7.62)
NEUTS SEG NFR BLD AUTO: 66 % (ref 43–75)
NITRITE UR QL STRIP: NEGATIVE
NON-SQ EPI CELLS URNS QL MICRO: ABNORMAL /HPF
NRBC BLD AUTO-RTO: 0 /100 WBCS
PH UR STRIP.AUTO: 6.5 [PH]
PLATELET # BLD AUTO: 245 THOUSANDS/UL (ref 149–390)
PMV BLD AUTO: 11.2 FL (ref 8.9–12.7)
PROT UR STRIP-MCNC: ABNORMAL MG/DL
RBC # BLD AUTO: 4.21 MILLION/UL (ref 3.81–5.12)
RBC #/AREA URNS AUTO: ABNORMAL /HPF
RH BLD: POSITIVE
RUBV IGG SERPL IA-ACNC: 48.9 IU/ML
SP GR UR STRIP.AUTO: 1.01 (ref 1–1.03)
SPECIMEN EXPIRATION DATE: NORMAL
TREPONEMA PALLIDUM IGG+IGM AB [PRESENCE] IN SERUM OR PLASMA BY IMMUNOASSAY: NORMAL
UROBILINOGEN UR STRIP-ACNC: <2 MG/DL
WBC # BLD AUTO: 7.63 THOUSAND/UL (ref 4.31–10.16)
WBC #/AREA URNS AUTO: ABNORMAL /HPF

## 2024-08-14 PROCEDURE — 82951 GLUCOSE TOLERANCE TEST (GTT): CPT

## 2024-08-14 PROCEDURE — 86706 HEP B SURFACE ANTIBODY: CPT

## 2024-08-14 PROCEDURE — 82677 ASSAY OF ESTRIOL: CPT

## 2024-08-14 PROCEDURE — 85025 COMPLETE CBC W/AUTO DIFF WBC: CPT

## 2024-08-14 PROCEDURE — 86780 TREPONEMA PALLIDUM: CPT

## 2024-08-14 PROCEDURE — 86762 RUBELLA ANTIBODY: CPT

## 2024-08-14 PROCEDURE — 87086 URINE CULTURE/COLONY COUNT: CPT

## 2024-08-14 PROCEDURE — 86901 BLOOD TYPING SEROLOGIC RH(D): CPT

## 2024-08-14 PROCEDURE — 86850 RBC ANTIBODY SCREEN: CPT

## 2024-08-14 PROCEDURE — 82952 GTT-ADDED SAMPLES: CPT

## 2024-08-14 PROCEDURE — 81001 URINALYSIS AUTO W/SCOPE: CPT

## 2024-08-14 PROCEDURE — 36415 COLL VENOUS BLD VENIPUNCTURE: CPT

## 2024-08-14 PROCEDURE — 86803 HEPATITIS C AB TEST: CPT

## 2024-08-14 PROCEDURE — 86336 INHIBIN A: CPT

## 2024-08-14 PROCEDURE — 86900 BLOOD TYPING SEROLOGIC ABO: CPT

## 2024-08-14 PROCEDURE — 82105 ALPHA-FETOPROTEIN SERUM: CPT

## 2024-08-14 PROCEDURE — 84702 CHORIONIC GONADOTROPIN TEST: CPT

## 2024-08-14 PROCEDURE — 87389 HIV-1 AG W/HIV-1&-2 AB AG IA: CPT

## 2024-08-14 PROCEDURE — 87340 HEPATITIS B SURFACE AG IA: CPT

## 2024-08-15 LAB
BACTERIA UR CULT: NORMAL
GP B STREP DNA SPEC QL NAA+PROBE: POSITIVE

## 2024-08-20 LAB
2ND TRIMESTER 4 SCREEN SERPL-IMP: ABNORMAL
2ND TRIMESTER 4 SCREEN SERPL-IMP: ABNORMAL
AFP ADJ MOM SERPL: 1.62
AFP SERPL-MCNC: 100.5 NG/ML
AGE AT DELIVERY: 41.5 YR
FET TS 18 RISK FROM MAT AGE: ABNORMAL
FET TS 21 RISK FROM MAT AGE: 58
GA METHOD: ABNORMAL
GA: 19.9 WEEKS
HCG ADJ MOM SERPL: 0.96
HCG SERPL-ACNC: ABNORMAL MIU/ML
IDDM PATIENT QL: NO
INHIBIN A ADJ MOM SERPL: 4.3
INHIBIN A SERPL-MCNC: 821.22 PG/ML
KARYOTYP BLD/T: ABNORMAL
MULTIPLE PREGNANCY: NO
NEURAL TUBE DEFECT RISK FETUS: 1995 %
SERVICE CMNT-IMP: ABNORMAL
TS 18 RISK FETUS: ABNORMAL
TS 21 RISK FETUS: 33
U ESTRIOL ADJ MOM SERPL: 6.66
U ESTRIOL SERPL-MCNC: 15.66 NG/ML

## 2024-08-27 ENCOUNTER — ROUTINE PRENATAL (OUTPATIENT)
Dept: OBGYN CLINIC | Facility: CLINIC | Age: 39
End: 2024-08-27
Payer: COMMERCIAL

## 2024-08-27 VITALS
WEIGHT: 151 LBS | DIASTOLIC BLOOD PRESSURE: 86 MMHG | HEIGHT: 63 IN | SYSTOLIC BLOOD PRESSURE: 124 MMHG | BODY MASS INDEX: 26.75 KG/M2

## 2024-08-27 DIAGNOSIS — R03.0 ELEVATED BLOOD PRESSURE READING WITHOUT DIAGNOSIS OF HYPERTENSION: ICD-10-CM

## 2024-08-27 DIAGNOSIS — O34.10 UTERINE FIBROID IN PREGNANCY: ICD-10-CM

## 2024-08-27 DIAGNOSIS — B95.1 POSITIVE GBS TEST: ICD-10-CM

## 2024-08-27 DIAGNOSIS — O09.523 AMA (ADVANCED MATERNAL AGE) MULTIGRAVIDA 35+, THIRD TRIMESTER: Primary | ICD-10-CM

## 2024-08-27 DIAGNOSIS — Z78.9 NONIMMUNE TO HEPATITIS B VIRUS: ICD-10-CM

## 2024-08-27 DIAGNOSIS — Z3A.38 38 WEEKS GESTATION OF PREGNANCY: ICD-10-CM

## 2024-08-27 DIAGNOSIS — D25.9 UTERINE FIBROID IN PREGNANCY: ICD-10-CM

## 2024-08-27 PROCEDURE — 99213 OFFICE O/P EST LOW 20 MIN: CPT | Performed by: OBSTETRICS & GYNECOLOGY

## 2024-08-27 NOTE — ASSESSMENT & PLAN NOTE
- Continue PNV  - Labor precautions reviewed  - Fetal kick counts reviewed  - Labs: UTD (Reviewed normal prenatal labs with the exception of Hep B nonimmune - reviewed with patient today); Normal 3hr GTT  - Genetics: None completed (QUAD completed but invalid due to time collected)  - Ultrasounds: Most recent US wnl on 24  - Tdap: Administered 24  - Flu Shot: Will offer in season  - RSV:  Will offer during season (-) @ 12d2d-33d0i   - COVID: Vaccinated  - Rhogam: N/A  - Delivery:  with epidural  - Contraception: Patient reports that her family is complete. Reviewed sterilization (either male or female) and LARC previously. Recommended either Mirena or Paragard for length of efficacy.   - Breastfeeding: Yes; pump ordered previously  - Pediatrician: Undecided  - GBS positive  - IOL: Discussed and patient agreeable to IOL on or before due date; Request sent  - RTO in 1 week

## 2024-08-27 NOTE — PROGRESS NOTES
OB/GYN  PN Visit  Zahra Herman  [id number]  2024  4:16 PM  Dr. Azra Regalado MD    S: 39 y.o.  38w1d here for PN visit. She reports cramping. She denies leakage of fluid and vaginal bleeding. She reports good fetal movement. She denies nausea, vomiting, headache, edema, domestic violence, and smoking. Her pregnancy is complicated by AMA, fetal pyelectasis and fibroids.     O:  Pre-Silvia Vitals      Flowsheet Row Most Recent Value   Prenatal Assessment    Fetal Heart Rate 138   Fundal Height (cm) 38 cm   Movement Present   Presentation Vertex   Prenatal Vitals    Blood Pressure 124/86   Weight - Scale 68.5 kg (151 lb)   Urine Albumin/Glucose    Dilation/Effacement/Station    Cervical Dilation 3   Cervical Effacement 50   Fetal Station -3   Vaginal Drainage    Draining Fluid No   Edema    LLE Edema None   RLE Edema None          Physical Exam  Vitals reviewed.   Constitutional:       General: She is not in acute distress.     Appearance: Normal appearance. She is well-developed. She is not ill-appearing, toxic-appearing or diaphoretic.   Cardiovascular:      Rate and Rhythm: Normal rate.   Pulmonary:      Effort: Pulmonary effort is normal. No respiratory distress.   Abdominal:      General: There is no distension.      Palpations: Abdomen is soft. There is no mass.      Tenderness: There is no abdominal tenderness. There is no guarding or rebound.   Genitourinary:     General: Normal vulva.      Exam position: Lithotomy position.      Labia:         Right: No rash, tenderness, lesion or injury.         Left: No rash, tenderness, lesion or injury.       Comments: Gravid, nontender  Skin:     General: Skin is warm and dry.   Neurological:      Mental Status: She is alert and oriented to person, place, and time.   Psychiatric:         Mood and Affect: Mood normal.         Behavior: Behavior normal.           Problem List          Unprioritized    AMA (advanced maternal age) multigravida 35+, third  trimester    Overview     Recommended 32 week growth  NST/ CASSY at 36 weeks  Delivery at or before EDC         38 weeks gestation of pregnancy    Current Assessment & Plan     - Continue PNV  - Labor precautions reviewed  - Fetal kick counts reviewed  - Labs: UTD (Reviewed normal prenatal labs with the exception of Hep B nonimmune - reviewed with patient today); Normal 3hr GTT  - Genetics: None completed (QUAD completed but invalid due to time collected)  - Ultrasounds: Most recent US wnl on 24  - Tdap: Administered 24  - Flu Shot: Will offer in season  - RSV:  Will offer during season (-) @ 91u3m-55q1q   - COVID: Vaccinated  - Rhogam: N/A  - Delivery:  with epidural  - Contraception: Patient reports that her family is complete. Reviewed sterilization (either male or female) and LARC previously. Recommended either Mirena or Paragard for length of efficacy.   - Breastfeeding: Yes; pump ordered previously  - Pediatrician: Undecided  - GBS positive  - IOL: Discussed and patient agreeable to IOL on or before due date; Request sent  - RTO in 1 week         Elevated blood pressure reading without diagnosis of hypertension    Overview     Elevated BP on 24         Uterine fibroid in pregnancy    Overview     1. Intramural myometrium fibroid located in the middle anterior corpus region, measuring 2.8 x 1.7 x 2.1cm with a volume of 5.3cc. Color doppler flow was not increased. The appearance was hypoechoic.     2. Intramural myometrium fibroid located in the right lateral corpus region, measuring 2.1 x 1.8 x 2.7cm with a volume of 5.3cc. Color doppler flow was not increased. The appearance was hypoechoic.         Positive GBS test    Overview     Will need PCN in labor          Nonimmune to hepatitis B virus    Overview     Recommend Hep B vaccination series              Future Appointments   Date Time Provider Department Center   9/3/2024  2:30 PM Denia Barboza PA-C [facility]   2024  12:45 PM Azra Regalado MD [facility]         Azra Regalado MD  8/27/2024  4:16 PM

## 2024-08-28 ENCOUNTER — HOSPITAL ENCOUNTER (INPATIENT)
Facility: HOSPITAL | Age: 39
LOS: 2 days | Discharge: HOME | DRG: 560 | End: 2024-08-30
Attending: OBSTETRICS & GYNECOLOGY | Admitting: OBSTETRICS & GYNECOLOGY
Payer: COMMERCIAL

## 2024-08-28 ENCOUNTER — ANESTHESIA (OUTPATIENT)
Dept: ANESTHESIOLOGY | Facility: HOSPITAL | Age: 39
DRG: 560 | End: 2024-08-28
Payer: COMMERCIAL

## 2024-08-28 ENCOUNTER — ANESTHESIA EVENT (OUTPATIENT)
Dept: ANESTHESIOLOGY | Facility: HOSPITAL | Age: 39
DRG: 560 | End: 2024-08-28
Payer: COMMERCIAL

## 2024-08-28 LAB
ABO GROUP BLD: NORMAL
ALBUMIN SERPL BCG-MCNC: 3.6 G/DL (ref 3.5–5)
ALP SERPL-CCNC: 141 U/L (ref 34–104)
ALT SERPL W P-5'-P-CCNC: 10 U/L (ref 7–52)
ANION GAP SERPL CALCULATED.3IONS-SCNC: 10 MMOL/L (ref 4–13)
AST SERPL W P-5'-P-CCNC: 14 U/L (ref 13–39)
BASE EXCESS BLDCOA CALC-SCNC: -2.3 MMOL/L (ref 3–11)
BASE EXCESS BLDCOV CALC-SCNC: -2.3 MMOL/L (ref 1–9)
BILIRUB SERPL-MCNC: 0.32 MG/DL (ref 0.2–1)
BLD GP AB SCN SERPL QL: NEGATIVE
BUN SERPL-MCNC: 8 MG/DL (ref 5–25)
CALCIUM SERPL-MCNC: 8.6 MG/DL (ref 8.4–10.2)
CHLORIDE SERPL-SCNC: 106 MMOL/L (ref 96–108)
CO2 SERPL-SCNC: 20 MMOL/L (ref 21–32)
CREAT SERPL-MCNC: 0.61 MG/DL (ref 0.6–1.3)
CREAT UR-MCNC: 55.2 MG/DL
ERYTHROCYTE [DISTWIDTH] IN BLOOD BY AUTOMATED COUNT: 13.5 % (ref 11.6–15.1)
GFR SERPL CREATININE-BSD FRML MDRD: 112 ML/MIN/1.73SQ M
GLUCOSE SERPL-MCNC: 81 MG/DL (ref 65–140)
HCO3 BLDCOA-SCNC: 24.1 MMOL/L (ref 17.3–27.3)
HCO3 BLDCOV-SCNC: 20.6 MMOL/L (ref 12.2–28.6)
HCT VFR BLD AUTO: 38.8 % (ref 34.8–46.1)
HGB BLD-MCNC: 13 G/DL (ref 11.5–15.4)
MCH RBC QN AUTO: 29.3 PG (ref 26.8–34.3)
MCHC RBC AUTO-ENTMCNC: 33.5 G/DL (ref 31.4–37.4)
MCV RBC AUTO: 88 FL (ref 82–98)
O2 CT VFR BLDCOA CALC: 7.7 ML/DL
OXYHGB MFR BLDCOA: 35.9 %
OXYHGB MFR BLDCOV: 80.7 %
PCO2 BLDCOA: 46.9 MM[HG] (ref 30–60)
PCO2 BLDCOV: 31.1 MM HG (ref 27–43)
PH BLDCOA: 7.33 [PH] (ref 7.23–7.43)
PH BLDCOV: 7.44 [PH] (ref 7.19–7.49)
PLATELET # BLD AUTO: 236 THOUSANDS/UL (ref 149–390)
PMV BLD AUTO: 11.2 FL (ref 8.9–12.7)
PO2 BLDCOA: 17.1 MM HG (ref 5–25)
PO2 BLDCOV: 34.3 MM HG (ref 15–45)
POTASSIUM SERPL-SCNC: 3.8 MMOL/L (ref 3.5–5.3)
PROT SERPL-MCNC: 6.8 G/DL (ref 6.4–8.4)
PROT UR-MCNC: 10 MG/DL
PROT/CREAT UR: 0.2 MG/G{CREAT} (ref 0–0.1)
RBC # BLD AUTO: 4.43 MILLION/UL (ref 3.81–5.12)
RH BLD: POSITIVE
SAO2 % BLDCOV: 18.5 ML/DL
SODIUM SERPL-SCNC: 136 MMOL/L (ref 135–147)
SPECIMEN EXPIRATION DATE: NORMAL
TREPONEMA PALLIDUM IGG+IGM AB [PRESENCE] IN SERUM OR PLASMA BY IMMUNOASSAY: NORMAL
WBC # BLD AUTO: 7.03 THOUSAND/UL (ref 4.31–10.16)

## 2024-08-28 PROCEDURE — 86850 RBC ANTIBODY SCREEN: CPT | Performed by: PHYSICIAN ASSISTANT

## 2024-08-28 PROCEDURE — 88307 TISSUE EXAM BY PATHOLOGIST: CPT | Performed by: PATHOLOGY

## 2024-08-28 PROCEDURE — 80053 COMPREHEN METABOLIC PANEL: CPT | Performed by: PHYSICIAN ASSISTANT

## 2024-08-28 PROCEDURE — 84156 ASSAY OF PROTEIN URINE: CPT | Performed by: PHYSICIAN ASSISTANT

## 2024-08-28 PROCEDURE — 86900 BLOOD TYPING SEROLOGIC ABO: CPT | Performed by: PHYSICIAN ASSISTANT

## 2024-08-28 PROCEDURE — NC001 PR NO CHARGE: Performed by: PHYSICIAN ASSISTANT

## 2024-08-28 PROCEDURE — 85027 COMPLETE CBC AUTOMATED: CPT | Performed by: PHYSICIAN ASSISTANT

## 2024-08-28 PROCEDURE — 99213 OFFICE O/P EST LOW 20 MIN: CPT

## 2024-08-28 PROCEDURE — NC001 PR NO CHARGE: Performed by: OBSTETRICS & GYNECOLOGY

## 2024-08-28 PROCEDURE — 82805 BLOOD GASES W/O2 SATURATION: CPT | Performed by: OBSTETRICS & GYNECOLOGY

## 2024-08-28 PROCEDURE — 82570 ASSAY OF URINE CREATININE: CPT | Performed by: PHYSICIAN ASSISTANT

## 2024-08-28 PROCEDURE — 4A1HXCZ MONITORING OF PRODUCTS OF CONCEPTION, CARDIAC RATE, EXTERNAL APPROACH: ICD-10-PCS | Performed by: OBSTETRICS & GYNECOLOGY

## 2024-08-28 PROCEDURE — 86901 BLOOD TYPING SEROLOGIC RH(D): CPT | Performed by: PHYSICIAN ASSISTANT

## 2024-08-28 PROCEDURE — 59409 OBSTETRICAL CARE: CPT | Performed by: OBSTETRICS & GYNECOLOGY

## 2024-08-28 PROCEDURE — 86780 TREPONEMA PALLIDUM: CPT | Performed by: PHYSICIAN ASSISTANT

## 2024-08-28 RX ORDER — ONDANSETRON 2 MG/ML
4 INJECTION INTRAMUSCULAR; INTRAVENOUS EVERY 8 HOURS PRN
Status: DISCONTINUED | OUTPATIENT
Start: 2024-08-28 | End: 2024-08-30 | Stop reason: HOSPADM

## 2024-08-28 RX ORDER — METHYLERGONOVINE MALEATE 0.2 MG/ML
INJECTION INTRAVENOUS
Status: DISPENSED
Start: 2024-08-28 | End: 2024-08-29

## 2024-08-28 RX ORDER — OXYTOCIN/RINGER'S LACTATE 30/500 ML
PLASTIC BAG, INJECTION (ML) INTRAVENOUS
Status: DISPENSED
Start: 2024-08-28 | End: 2024-08-29

## 2024-08-28 RX ORDER — ACETAMINOPHEN 325 MG/1
650 TABLET ORAL EVERY 4 HOURS PRN
Status: DISCONTINUED | OUTPATIENT
Start: 2024-08-28 | End: 2024-08-30 | Stop reason: HOSPADM

## 2024-08-28 RX ORDER — SODIUM CHLORIDE, SODIUM LACTATE, POTASSIUM CHLORIDE, CALCIUM CHLORIDE 600; 310; 30; 20 MG/100ML; MG/100ML; MG/100ML; MG/100ML
125 INJECTION, SOLUTION INTRAVENOUS CONTINUOUS
Status: DISCONTINUED | OUTPATIENT
Start: 2024-08-28 | End: 2024-08-28

## 2024-08-28 RX ORDER — LIDOCAINE HYDROCHLORIDE AND EPINEPHRINE 15; 5 MG/ML; UG/ML
INJECTION, SOLUTION EPIDURAL
Status: COMPLETED | OUTPATIENT
Start: 2024-08-28 | End: 2024-08-28

## 2024-08-28 RX ORDER — CALCIUM CARBONATE 500 MG/1
1000 TABLET, CHEWABLE ORAL 3 TIMES DAILY PRN
Status: DISCONTINUED | OUTPATIENT
Start: 2024-08-28 | End: 2024-08-30 | Stop reason: HOSPADM

## 2024-08-28 RX ORDER — BENZOCAINE/MENTHOL 6 MG-10 MG
1 LOZENGE MUCOUS MEMBRANE DAILY PRN
Status: DISCONTINUED | OUTPATIENT
Start: 2024-08-28 | End: 2024-08-30 | Stop reason: HOSPADM

## 2024-08-28 RX ORDER — OXYTOCIN/RINGER'S LACTATE 30/500 ML
1-30 PLASTIC BAG, INJECTION (ML) INTRAVENOUS
Status: DISCONTINUED | OUTPATIENT
Start: 2024-08-28 | End: 2024-08-28

## 2024-08-28 RX ORDER — SIMETHICONE 80 MG
80 TABLET,CHEWABLE ORAL 4 TIMES DAILY PRN
Status: DISCONTINUED | OUTPATIENT
Start: 2024-08-28 | End: 2024-08-30 | Stop reason: HOSPADM

## 2024-08-28 RX ORDER — IBUPROFEN 600 MG/1
600 TABLET, FILM COATED ORAL EVERY 6 HOURS PRN
Status: DISCONTINUED | OUTPATIENT
Start: 2024-08-28 | End: 2024-08-30 | Stop reason: HOSPADM

## 2024-08-28 RX ORDER — CARBOPROST TROMETHAMINE 250 UG/ML
INJECTION, SOLUTION INTRAMUSCULAR
Status: DISPENSED
Start: 2024-08-28 | End: 2024-08-29

## 2024-08-28 RX ORDER — DOCUSATE SODIUM 100 MG/1
100 CAPSULE, LIQUID FILLED ORAL 2 TIMES DAILY
Status: DISCONTINUED | OUTPATIENT
Start: 2024-08-28 | End: 2024-08-30 | Stop reason: HOSPADM

## 2024-08-28 RX ORDER — CEFAZOLIN SODIUM 1 G/50ML
1000 SOLUTION INTRAVENOUS ONCE
Status: COMPLETED | OUTPATIENT
Start: 2024-08-28 | End: 2024-08-28

## 2024-08-28 RX ADMIN — ACETAMINOPHEN 650 MG: 325 TABLET, FILM COATED ORAL at 23:29

## 2024-08-28 RX ADMIN — BENZOCAINE AND LEVOMENTHOL 1 APPLICATION: 200; 5 SPRAY TOPICAL at 19:29

## 2024-08-28 RX ADMIN — DOCUSATE SODIUM 100 MG: 100 CAPSULE, LIQUID FILLED ORAL at 18:51

## 2024-08-28 RX ADMIN — PENICILLIN G POTASSIUM 2.5 MILLION UNITS: 20000000 INJECTION, POWDER, FOR SOLUTION INTRAVENOUS at 16:05

## 2024-08-28 RX ADMIN — Medication 2 MILLI-UNITS/MIN: at 16:46

## 2024-08-28 RX ADMIN — CEFAZOLIN SODIUM 1000 MG: 1 SOLUTION INTRAVENOUS at 18:52

## 2024-08-28 RX ADMIN — HYDROCORTISONE 1 APPLICATION: 1 CREAM TOPICAL at 19:26

## 2024-08-28 RX ADMIN — IBUPROFEN 600 MG: 600 TABLET, FILM COATED ORAL at 19:26

## 2024-08-28 RX ADMIN — SODIUM CHLORIDE, SODIUM LACTATE, POTASSIUM CHLORIDE, AND CALCIUM CHLORIDE 999 ML/HR: .6; .31; .03; .02 INJECTION, SOLUTION INTRAVENOUS at 13:04

## 2024-08-28 RX ADMIN — WITCH HAZEL 1 PAD: 500 SOLUTION RECTAL; TOPICAL at 19:26

## 2024-08-28 RX ADMIN — ROPIVACAINE HYDROCHLORIDE: 2 INJECTION, SOLUTION EPIDURAL; INFILTRATION at 14:30

## 2024-08-28 RX ADMIN — PENICILLIN G POTASSIUM 5 MILLION UNITS: 20000000 INJECTION, POWDER, FOR SOLUTION INTRAVENOUS at 13:05

## 2024-08-28 RX ADMIN — SODIUM CHLORIDE, SODIUM LACTATE, POTASSIUM CHLORIDE, AND CALCIUM CHLORIDE 125 ML/HR: .6; .31; .03; .02 INJECTION, SOLUTION INTRAVENOUS at 14:19

## 2024-08-28 RX ADMIN — LIDOCAINE HYDROCHLORIDE AND EPINEPHRINE 3 ML: 15; 5 INJECTION, SOLUTION EPIDURAL at 13:53

## 2024-08-28 NOTE — DISCHARGE SUMMARY
Discharge Summary - OB/GYN   Zahra Herman 39 y.o. female MRN: [id number]  Unit/Bed#: -01 Encounter: [id number]      Admission Date: 2024     Discharge Date: 2024    Admitting Diagnosis:   1. Pregnancy at 38w2d  2. Hep B non-immune  3. GBS positive  4. Elevated BP without dx of HTN  5. AMA    Discharge Diagnosis:   Same, delivered    Procedures: spontaneous vaginal delivery    Attending: Azra Regalado MD    Hospital Course:     Zahra Herman is a 39 y.o.  at 38w2d wks who was initially admitted for labor. She received an epidural for pain control. Pitocin was started for augmentation. She had AROM for clear fluid. She continued to make change to completely dilated and began pushing.     She delivered a viable female  on 24 at 1739. Weight 6lbs 11.6oz via spontaneous vaginal delivery. Apgars were 8 (1 min) and 9 (5 min).  was transferred to  nursery. Patient tolerated the procedure well and was transferred to recovery in stable condition.     Her post-partum course was complicated by requiring for Procardia 30 qD for blood pressure control. She also required Ancef 1g for retained membranes s/p multiple bimanual exams and banjo curettage, and ultrasound showing thin stripe. Her post-partum pain was well controlled with oral analgesics.    On day of discharge, she was ambulating and able to reasonably perform all ADLs. She was voiding and had appropriate bowel function. Pain was well controlled. She was discharged home on post-partum day #2 without complications. Patient was instructed to follow up with her OB as an outpatient and was given appropriate warnings to call provider if she develops signs of infection or uncontrolled pain.    Complications: none apparent    Condition at discharge: good     Discharge instructions/Information to patient and family:   See after visit summary for information provided to patient and family.      Provisions for Follow-Up  Care:  See after visit summary for information related to follow-up care and any pertinent home health orders.      Disposition: Home    Planned Readmission: No    Discharge Medications:  For a complete list of the patient's medications, please refer to her med rec.

## 2024-08-28 NOTE — L&D DELIVERY NOTE
DELIVERY NOTE  Zahra Herman 39 y.o. female MRN: [id number]  Unit/Bed#: -01 Encounter: [id number]    Obstetrician:    Dr. Azra Regalado MD    Assistant:   Dr. Glass     Pre-Delivery Diagnosis:   38 weeks gestation of pregnancy  AMA  Uterine fibroid  GBS positive  Hep B nonimmune  Urinary tract dilation     Post-Delivery Diagnosis:   Same as above - Delivered  Viable female fetus  Retained placental membranes (s/p removal)  Gestational hypertension    Procedure:  Spontaneous vaginal delivery  Removal of placental membranes    Findings:  1. Viable female  delivered on 24 with weight pending at time of dictation,  Apgar scores of 8 at one minute and 9 at five minutes.   2. Clear amniotic fluid  3. Spontaneous delivery of placenta with marginal cord (3-vessel cord)  4. Retained placental membranes with thin stripe on ultrasound after removal  5. Intact perineum    Specimens:   Cord blood obtained   Placenta; abnormal appearing membranes, marginal cord insertion   Arterial and venous blood gases (below)     Gases:  Umbilical Cord Venous Blood Gas:  Results from last 7 days   Lab Units 24  1742   PH COV  7.439   PCO2 COV mm HG 31.1   HCO3 COV mmol/L 20.6   BASE EXC COV mmol/L -2.3*   O2 CT CD VB mL/dL 18.5   O2 HGB, VENOUS CORD % 80.7     Umbilical Cord Arterial Blood Gas:  Results from last 7 days   Lab Units 24  1742   PH COA  7.328   PCO2 COA  46.9   PO2 COA mm HG 17.1   HCO3 COA mmol/L 24.1   BASE EXC COA mmol/L -2.3*   O2 CONTENT CORD ART ml/dl 7.7   O2 HGB, ARTERIAL CORD % 35.9       Quantitative Blood Loss:   96 mL           Complications:    Retained placental membranes       Brief labor course:   Zahra is a 40yo now  at 38w2d who was admitted for labor at 5cm dilated (changed from yesterday at 3cm). She was made comfortable with an epidural. PCN was started for GBS Ppx. Initially, her cervical exam was unchanged and pitocin was started at 1640 and then  discontinued at 1715 due to early deep decelerations with contractions. She was checked at 1720 and found to be 7/80/0 and membranes were ruptured for clear fluid in case of need for FSE. She was repositioned and felt pressure. She was rechecked and found to be fully dilated at 1728.      Procedure Details      Description of procedure     After pushing for 9 minutes, on 24 at 1739 patient delivered a viable female , Apgars of 8 (1 min) and 9 (5 min). The fetal vertex delivered spontaneously. There was a tight nuchal cord which we delivered through. With the assistance of maternal expulsive efforts and gentle downward traction of the fetal head, the anterior (right) shoulder was delivered without difficulty, followed by the remainder of the infant's body and contralateral arm. Umbilical cord was unwrapped from around the torso and legs.    After delivery of the , delayed clamping of the umbilical cord was undertaken for 30 seconds. The  was noted to have good tone and cry spontaneously. There was no apparent injury to the . The cord was then doubly clamped and cut and the  was passed off to  staff for routine care. Umbilical cord blood and umbilical artery and venous gases were collected. Active management of the third stage of labor was undertaken with IV pitocin at 250 milliunits/min.     Placenta was delivered with fundal massage and gentle traction on the cord with active management of the third stage of labor. Placenta delivered intact with a 3-vessel cord, but membranes were noted to be trailing. Membranes were grasped with ringed forceps and removed. A bimanual exam was performed and placental membranes were appreciated in the cervix. Membranes were extracted manually and were noted to be abnormally thickened. Banjo curette was gently utilized to reach the uterine fundus under ultrasound guidance for further removal of membranes. She was given Ancef for  infection prophylaxis. Endometrial strip was noted to be thin on ultrasound. Bleeding was noted to be under control.    Conclusion:  Mother and baby are currently recovering nicely in stable condition.    Azra Regalado MD  OB/GYN  8/28/2024  6:06 PM

## 2024-08-28 NOTE — H&P
H&P Exam - Obstetrics   Zahra Herman 39 y.o. female MRN: [id number]  Unit/Bed#: -01 Encounter: [id number]      Assessment & Plan     ASSESSMENT:    Zahra Herman is a 39 y.o.  with an ALDAIR of 2024, by Last Menstrual Period at 38w2d weeks gestation who was admitted for Active labor.    PLAN:   1) Admit   2) CBC, RPR, Blood Type   3) Analgesia and/or epidural at patient request   4) Anticipate        Medical Problems              Nonimmune to hepatitis B virus  Assessment & Plan  Offer pp booster    Positive GBS test  Assessment & Plan  Admit and plan PCN prophylaxis    Elevated blood pressure reading without diagnosis of hypertension  Assessment & Plan  Elevated BP on admit, will plan pre E labs    AMA (advanced maternal age) multigravida 35+, third trimester  Assessment & Plan  APFS done from 36 weeks         >2 Midnights    INPATIENT     Chief Complaint: Active labor    HPI:  Zahra Herman is a 39 y.o.  female with an ALDAIR of 2024, by Last Menstrual Period at 38w2d weeks gestation who is being admitted for Active labor.      Contractions: Frequency: Every 2-4 minutes.    Leakage of fluid: None.    Bleeding: None.    Fetal movement: present.    Pregnancy complications:  AMA, hep B NI, elevated BP, uterine fibroids .  Baby complications/comments: fetal urinary tract dilation    Review of Systems   Constitutional: Negative.    Psychiatric/Behavioral: Negative.         Historical Information   OB History    Para Term  AB Living   5 3 3   1 3   SAB IAB Ectopic Multiple Live Births   1       3      # Outcome Date GA Lbr Nathaniel/2nd Weight Sex Type Anes PTL Lv   5 Current            4 Term 12 37w0d  2722 g (6 lb) F Vag-Spont   CURT   3 Term 08 38w0d  3175 g (7 lb) M Vag-Spont   CURT   2 SAB  6w0d          1 Term 05 37w0d  2268 g (5 lb) F Vag-Spont   CURT       No past medical history on file.  No past surgical history on file.  Social History   Social History      Substance and Sexual Activity   Alcohol Use Yes     Social History     Substance and Sexual Activity   Drug Use Never     Social History     Tobacco Use   Smoking Status Some Days    Types: Cigarettes   Smokeless Tobacco Never     Family History: non-contributory    Meds/Allergies   {  Medications Prior to Admission:     acetaminophen (TYLENOL) 500 mg tablet    Prenat MV-Min w/Fe-Folate-DHA (Prenatal Complete) CPPK    Pyridoxine HCl (vitamin B-6) 25 MG tablet  No Known Allergies    Vitals: Blood pressure 150/83, pulse 62, temperature 98.1 °F (36.7 °C), temperature source Oral, resp. rate 18, last menstrual period 12/04/2023, not currently breastfeeding.There is no height or weight on file to calculate BMI.      Physical Exam  Constitutional:       Appearance: She is normal weight.      Comments: Pt notably uncomfortable with ctxns   Genitourinary:      Vulva normal.   HENT:      Head: Normocephalic and atraumatic.   Cardiovascular:      Rate and Rhythm: Normal rate.   Pulmonary:      Effort: Pulmonary effort is normal.   Neurological:      Mental Status: She is alert.   Skin:     General: Skin is warm and dry.   Psychiatric:         Mood and Affect: Mood normal.         Behavior: Behavior normal.         Thought Content: Thought content normal.         Judgment: Judgment normal.   Vitals reviewed.           Vaginal Exam  Discharge: large amount of thick mucousy d/c present in vaginal vault,   Membranes: intact,   Presentation: vtx, confirmed on US  Dilation: 5  Effacement: 70  Station: -2  Consistency: moderate  Position: mid    Fetal heart rate  moderate  Baseline: 150 bpm  [place]: ctxns q 2-4 minutes lasting 60 -80 seconds    EFW: 5lb2oz 64% at 33w2d    GBS: +; PCN allergy: no    Prenatal Labs: I have personally reviewed pertinent reports.      Imaging, EKG, Pathology, and Other Studies: I have personally reviewed pertinent reports.

## 2024-08-28 NOTE — ANESTHESIA PREPROCEDURE EVALUATION
Procedure:  LABOR ANALGESIA    Relevant Problems   GYN   (+) 38 weeks gestation of pregnancy   (+) AMA (advanced maternal age) multigravida 35+, third trimester        Physical Exam    Airway    Mallampati score: III  TM Distance: >3 FB  Neck ROM: full     Dental   No notable dental hx     Cardiovascular  Cardiovascular exam normal    Pulmonary  Pulmonary exam normal     Other Findings  post-pubertal.      Anesthesia Plan  ASA Score- 2     Anesthesia Type- epidural with ASA Monitors.         Additional Monitors:     Airway Plan:            Plan Factors-Exercise tolerance (METS): >4 METS.    Chart reviewed.   Existing labs reviewed. Patient summary reviewed.    Patient is not a current smoker.              Induction-     Postoperative Plan-     Perioperative Resuscitation Plan - Level 1 - Full Code.       Informed Consent- Anesthetic plan and risks discussed with patient.  I personally reviewed this patient with the CRNA. Discussed and agreed on the Anesthesia Plan with the CRNA..

## 2024-08-28 NOTE — ANESTHESIA POSTPROCEDURE EVALUATION
Post-Op Assessment Note    CV Status:  Stable  Pain Score: 0    Pain management: adequate      Post-op block assessment: catheter intact and no complications   Mental Status:  Alert and awake   Hydration Status:  Euvolemic   PONV Controlled:  Controlled   Airway Patency:  Patent     Post Op Vitals Reviewed: Yes    No anethesia notable event occurred.    Staff: CRNA               /96 (08/28/24 1915)    Temp      Pulse 60 (08/28/24 1915)   Resp      SpO2

## 2024-08-28 NOTE — ASSESSMENT & PLAN NOTE
CBC/CMP wnl, P/C 0.2  Systolic (12hrs), Av , Min:142 , Max:142   Diastolic (12hrs), Av, Min:79, Max:79

## 2024-08-28 NOTE — OB LABOR/OXYTOCIN SAFETY PROGRESS
Labor Progress Note - Zahra Herman 39 y.o. female MRN: [id number]    Unit/Bed#: -01 Encounter: [id number]       Contraction Frequency (minutes): 2-4.5  Contraction Intensity: Moderate/Strong  Uterine Activity Characteristics: Regular  Cervical Dilation: 5        Cervical Effacement: 70  Fetal Station: -2  Baseline Rate (FHR): 120 bpm  Fetal Heart Rate (FHT): 124 BPM  FHR Category: I               Vital Signs:   Vitals:    08/28/24 1412   BP: 129/78   Pulse: 61   Resp:    Temp:    SpO2:        Notes/comments:   SVE unchanged as above. FHT Cat I. Plan to initiate pitocin.   D/w Dr. Sabine Saab MD 8/28/2024 3:50 PM

## 2024-08-28 NOTE — ANESTHESIA PROCEDURE NOTES
Epidural Block    Patient location during procedure: OB/L&D  Start time: 8/28/2024 1:50 PM  Reason for block: procedure for pain and primary anesthetic  Staffing  Performed by: Bhaskar Simpson MD  Authorized by: Bhaskar Simpson MD    Preanesthetic Checklist  Completed: patient identified, IV checked, site marked, risks and benefits discussed, surgical consent, monitors and equipment checked, pre-op evaluation and timeout performed  Epidural  Patient position: sitting  Prep: ChloraPrep  Sedation Level: no sedation  Patient monitoring: frequent blood pressure checks, continuous pulse oximetry and heart rate  Approach: midline  Location: lumbar, L3-4  Injection technique: JOSE saline  Needle  Needle type: Tuohy   Needle gauge: 17 G  Needle insertion depth: 6 cm  Catheter type: multi-orifice  Catheter size: 19 G  Catheter at skin depth: 11 cm  Catheter securement method: stabilization device and clear occlusive dressing  Test dose: negativelidocaine-epinephrine (XYLOCAINE-MPF/EPINEPHRINE) 1.5 %-1:200,000 injection 3 mL - Epidural   3 mL - 8/28/2024 1:53:00 PM  Assessment  Sensory level: T10  Number of attempts: 2negative aspiration for CSF, negative aspiration for heme and no paresthesia on injection  patient tolerated the procedure well with no immediate complications

## 2024-08-29 PROBLEM — O13.9 GESTATIONAL HYPERTENSION: Status: ACTIVE | Noted: 2024-07-15

## 2024-08-29 PROCEDURE — 99024 POSTOP FOLLOW-UP VISIT: CPT | Performed by: STUDENT IN AN ORGANIZED HEALTH CARE EDUCATION/TRAINING PROGRAM

## 2024-08-29 RX ORDER — LIDOCAINE 50 MG/G
1 PATCH TOPICAL DAILY
Status: DISCONTINUED | OUTPATIENT
Start: 2024-08-30 | End: 2024-08-30 | Stop reason: HOSPADM

## 2024-08-29 RX ORDER — NIFEDIPINE 30 MG/1
30 TABLET, EXTENDED RELEASE ORAL DAILY
Status: DISCONTINUED | OUTPATIENT
Start: 2024-08-29 | End: 2024-08-30 | Stop reason: HOSPADM

## 2024-08-29 RX ADMIN — DOCUSATE SODIUM 100 MG: 100 CAPSULE, LIQUID FILLED ORAL at 10:07

## 2024-08-29 RX ADMIN — IBUPROFEN 600 MG: 600 TABLET, FILM COATED ORAL at 10:06

## 2024-08-29 RX ADMIN — DOCUSATE SODIUM 100 MG: 100 CAPSULE, LIQUID FILLED ORAL at 17:52

## 2024-08-29 RX ADMIN — IBUPROFEN 600 MG: 600 TABLET, FILM COATED ORAL at 02:39

## 2024-08-29 RX ADMIN — ACETAMINOPHEN 650 MG: 325 TABLET, FILM COATED ORAL at 06:27

## 2024-08-29 RX ADMIN — ACETAMINOPHEN 650 MG: 325 TABLET, FILM COATED ORAL at 17:51

## 2024-08-29 NOTE — PLAN OF CARE

## 2024-08-29 NOTE — PROGRESS NOTES
"Progress Note - OB/GYN  Zahra Hreman 39 y.o. female MRN: [id number]  Unit/Bed#: -01 Encounter: [id number]    Assessment and Plan     Zahra Herman is a patient of: [facility] . She is PPD# 1 s/p  spontaneous vaginal delivery  Recovering well and is stable     Retained products of conception after delivery without hemorrhage  Assessment & Plan  S/p multiple bimanual exams, banjo curettage -> US showing thin stripe    Gestational hypertension  Assessment & Plan  Met criteria during this admission  CBC/CMP wnl, P/C 0.2  Systolic (12hrs), Av , Min:118 , Max:144   Diastolic (12hrs), Av, Min:68, Max:96    *  (spontaneous vaginal delivery)  Assessment & Plan  - Pain well controlled with oral analgesics  - Voiding spontaneously  - Tolerating PO fluids and solids  - Ambulating without difficulty  - Contraception: undecided  - Using formula       Disposition    - Anticipate discharge home on PPD# 2      Subjective/Objective     Chief Complaint: Postpartum State     Subjective:    Zahra Herman is PPD#1 s/p  spontaneous vaginal delivery. She has complaints of back pain at the epidural site. She rates her pain a constant 7/10. She denies any lower extremity weakness or sensory deficits.  Pain is not well controlled.  Patient is currently voiding.  She is ambulating.  Patient is currently passing flatus and has had no bowel movement. She is tolerating PO, and denies nausea or vomitting. Patient denies fever, chills, chest pain, shortness of breath, or calf tenderness. Lochia is normal. She is  formula. She is recovering well and is stable.       Vitals:   /68 (BP Location: Right arm)   Pulse 59   Temp 98.2 °F (36.8 °C) (Oral)   Resp 20   Ht 5' 3\" (1.6 m)   Wt 68.5 kg (151 lb)   LMP 2023   SpO2 98%   Breastfeeding Yes   BMI 26.75 kg/m²       Intake/Output Summary (Last 24 hours) at 2024 0643  Last data filed at 2024 0006  Gross per 24 hour   Intake 1248.75 ml   Output " 1796 ml   Net -547.25 ml       Invasive Devices       Peripheral Intravenous Line  Duration             Peripheral IV 08/28/24 Left;Ventral (anterior) Forearm <1 day                    Physical Exam:   GEN: Zahra Herman appears well, alert and oriented x 3, pleasant and cooperative   CARDIO: RRR, no murmurs or rubs  RESP:  CTAB, no wheezes or rales  ABDOMEN: soft, no tenderness, no distention, fundus firm and non-tender @ umbilicus  EXTREMITIES: non tender, no erythema  Neuro: LE strength 5/5 with dorsiflexion and plantarflexion, and flexion and extension of the knees and hips, and sensation intact. Mild swelling with tenderness to light palpation at the epidural site.       Labs:     Hemoglobin   Date Value Ref Range Status   08/28/2024 13.0 11.5 - 15.4 g/dL Final   08/14/2024 12.3 11.5 - 15.4 g/dL Final     WBC   Date Value Ref Range Status   08/28/2024 7.03 4.31 - 10.16 Thousand/uL Final   08/14/2024 7.63 4.31 - 10.16 Thousand/uL Final     Platelets   Date Value Ref Range Status   08/28/2024 236 149 - 390 Thousands/uL Final   08/14/2024 245 149 - 390 Thousands/uL Final     Creatinine   Date Value Ref Range Status   08/28/2024 0.61 0.60 - 1.30 mg/dL Final     Comment:     Standardized to IDMS reference method   05/23/2024 0.61 0.60 - 1.30 mg/dL Final     Comment:     Standardized to IDMS reference method     AST   Date Value Ref Range Status   08/28/2024 14 13 - 39 U/L Final   05/23/2024 11 (L) 13 - 39 U/L Final     ALT   Date Value Ref Range Status   08/28/2024 10 7 - 52 U/L Final     Comment:     Specimen collection should occur prior to Sulfasalazine administration due to the potential for falsely depressed results.    05/23/2024 10 7 - 52 U/L Final     Comment:     Specimen collection should occur prior to Sulfasalazine administration due to the potential for falsely depressed results.           Michael Reyes MD  8/29/2024  6:43 AM

## 2024-08-29 NOTE — CASE MANAGEMENT
Case Management Progress Note    Patient name Zahra Herman  Location /-01 MRN [id number]  : 1985 Date 2024       LOS (days): 1  Geometric Mean LOS (GMLOS) (days):   Days to GMLOS:        OBJECTIVE:        Current admission status: Inpatient  Preferred Pharmacy:   [id number] - [place] - [address] [place] [address]  Phone: [phone] Fax: [phone]    Primary Care Provider: No primary care provider on file.    Primary Insurance: [id number]  Secondary Insurance:     PROGRESS NOTE:    CM received consult for MOB requesting Spectra S2 for home use. Order placed to [facility] via [place]. Pump delivered to bedside by CM.

## 2024-08-29 NOTE — LACTATION NOTE
This note was copied from a baby's chart.  CONSULT - LACTATION  Baby Girl (Jorden Herman 1 days female MRN: [id number]    [facility] AN NURSERY Room / Bed: (N)/(N) Encounter: [id number]    Maternal Information     MOTHER:  Zahra Herman  Maternal Age: 39 y.o.  OB History: # 1 - Date: 05, Sex: Female, Weight: 2268 g (5 lb), GA: 37w0d, Type: Vaginal, Spontaneous, Apgar1: None, Apgar5: None, Living: Living, Birth Comments: None    # 2 - Date: , Sex: None, Weight: None, GA: 6w0d, Type: None, Apgar1: None, Apgar5: None, Living: None, Birth Comments: None    # 3 - Date: 08, Sex: Male, Weight: 3175 g (7 lb), GA: 38w0d, Type: Vaginal, Spontaneous, Apgar1: None, Apgar5: None, Living: Living, Birth Comments: None    # 4 - Date: 12, Sex: Female, Weight: 2722 g (6 lb), GA: 37w0d, Type: Vaginal, Spontaneous, Apgar1: None, Apgar5: None, Living: Living, Birth Comments: None    # 5 - Date: 24, Sex: Female, Weight: 3050 g (6 lb 11.6 oz), GA: 38w2d, Type: Vaginal, Spontaneous, Apgar1: 8, Apgar5: 9, Living: Living, Birth Comments: None   Previouse breast reduction surgery? No    Lactation history:   Has patient previously breast fed:     How long had patient previously breast fed:     Previous breast feeding complications:     No past surgical history on file.    Birth information:  YOB: 2026   Time of birth: 5:39 PM   Sex: female   Delivery type: Vaginal, Spontaneous   Birth Weight: 3050 g (6 lb 11.6 oz)   Percent of Weight Change: 0%     Gestational Age: 38w2d   [unfilled]    Assessment     Breast and nipple assessment:  symmetrical, round breasts with dark areolas and everted, round nipples. L nipple presents with bleeding blister at 7 pm on the nipple face. R nipple has blister on 5 pm on the nipple face     Assessment:  sleeping in bassinet    Feeding assessment: latch difficulty (due to positioning)       Feeding recommendations:   breast feed on demand. Mom is an experienced breast feeding mother that breast fed her other children for over 1 year (3x). Mom States that Angeles ,latched in the delivery room and has been placed to the breast when she is demonstrating early feeding cues.     Currently, Zahra complains of nipple pain. Upon assessment, R nipple has blister on  the nipple face at 5 pm. L nipple has a bleeding blister at 7 pm on the nipple face.     Demonstration and teach back of wound care    Provided breast shells    RSB/DC reviewed    Mom wants a s2 pump - order sent to     Demonstration and education on positioning at the breast. Encouraged to have pillows to lift her arms/baby up to the breast. Enc. Cross cradle to latch and cradle to support.     Enc. To call lactation for the next latch.     To help your nipples heal, in addition to paying close attention to latch and positioning, apply protective ointment after feeding or pumping and cover with an occlusive dressing.    Information on hand expression given. Discussed benefits of knowing how to manually express breast including stimulating milk supply, softening nipple for latch and evacuating breast in the event of engorgement.    Mom is encouraged to place baby skin to skin for feedings. Skin to skin education provided for baby placement on mother's chest, baby only in diaper, blankets below shoulders on baby's back. Skin to skin is encouraged to continue at home for feedings and between feedings.    Worked on positioning infant up at chest level and starting to feed infant with nose arriving at the nipple. Then, using areolar compression to achieve a deep latch that is comfortable and exchanges optimum amounts of milk.     - Start feedings on breast that last feeding ended   - allow no more than 3 hours between breast feeding sessions   - time between feedings is counted from the beginning of the first feed to the beginning of the next feeding session    Reviewed early  signs of hunger, including tensing of hands and shoulders - no need to wait for open eyes.  Crying is a late hunger sign.  If baby is crying, soothe baby first and then attempt to latch.  Reviewed normal sucking patterns: transition from stimulation to nutritive to release or non-nutritive. The goal is to see and hear lots of swallowing.    Reviewed normal nursing pattern: infant could latch on one breast up to 30 minutes or until releases on own. Signs of satiation is open hand with fingers that do not grab your finger.  Discussed difference in sensation of non-nutritive v nutritive sucking    Met with mother. Provided mother with Ready, Set, Baby booklet.    Discussed Skin to Skin contact an benefits to mom and baby.  Talked about the delay of the first bath until baby has adjusted. Spoke about the benefits of rooming in. Feeding on cue and what that means for recognizing infant's hunger. Avoidance of pacifiers for the first month discussed. Talked about exclusive breastfeeding for the first 6 months.    Positioning and latch reviewed as well as showing images of other feeding positions.  Discussed the properties of a good latch in any position. Reviewed hand/manual expression.  Discussed s/s that baby is getting enough milk and some s/s that breastfeeding dyad may need further help.    Gave information on common concerns, what to expect the first few weeks after delivery, preparing for other caregivers, and how partners can help. Resources for support also provided.    Encouraged parents to call for assistance, questions, and concerns about breastfeeding.  Extension provided.    Provided education on growth spurts, when to introduce bottles; paced bottle feeding, and non-nutritive suck at the breast. Provided education on Signs of satiation. Encouraged to call lactation to observe a latch prior to discharge for reassurance. Encouraged to call baby and me with any questions and closely monitor output.      Anahi  Hume, MA 8/29/2024 11:52 AM

## 2024-08-29 NOTE — ASSESSMENT & PLAN NOTE
- Pain well controlled with oral analgesics  - Voiding spontaneously  - Tolerating PO fluids and solids  - Ambulating without difficulty  - Contraception: undecided  - Breastfeeding  - Patient would like to go home today

## 2024-08-29 NOTE — PLAN OF CARE

## 2024-08-30 VITALS
DIASTOLIC BLOOD PRESSURE: 82 MMHG | OXYGEN SATURATION: 98 % | WEIGHT: 151 LBS | HEART RATE: 67 BPM | TEMPERATURE: 97.9 F | RESPIRATION RATE: 18 BRPM | BODY MASS INDEX: 26.75 KG/M2 | HEIGHT: 63 IN | SYSTOLIC BLOOD PRESSURE: 136 MMHG

## 2024-08-30 PROCEDURE — 99024 POSTOP FOLLOW-UP VISIT: CPT | Performed by: OBSTETRICS & GYNECOLOGY

## 2024-08-30 RX ORDER — ACETAMINOPHEN 325 MG/1
650 TABLET ORAL EVERY 6 HOURS PRN
Start: 2024-08-30 | End: 2024-09-06

## 2024-08-30 RX ORDER — BENZOCAINE/MENTHOL 6 MG-10 MG
1 LOZENGE MUCOUS MEMBRANE DAILY PRN
Start: 2024-08-30

## 2024-08-30 RX ORDER — NIFEDIPINE 30 MG/1
30 TABLET, EXTENDED RELEASE ORAL DAILY
Qty: 30 TABLET | Refills: 0 | Status: CANCELLED | OUTPATIENT
Start: 2024-08-30 | End: 2024-09-29

## 2024-08-30 RX ORDER — IBUPROFEN 600 MG/1
600 TABLET, FILM COATED ORAL EVERY 6 HOURS PRN
Start: 2024-08-30 | End: 2024-09-06

## 2024-08-30 RX ADMIN — LIDOCAINE 1 PATCH: 700 PATCH TOPICAL at 09:57

## 2024-08-30 RX ADMIN — DOCUSATE SODIUM 100 MG: 100 CAPSULE, LIQUID FILLED ORAL at 09:57

## 2024-08-30 RX ADMIN — IBUPROFEN 600 MG: 600 TABLET, FILM COATED ORAL at 09:57

## 2024-08-30 NOTE — PROGRESS NOTES
"Progress Note - OB/GYN  Zahra Herman 39 y.o. female MRN: [id number]  Unit/Bed#: -01 Encounter: [id number]    Assessment and Plan     Zahra Herman is a patient of: [facility] . She is PPD# 2 s/p  spontaneous vaginal delivery  Recovering well and is stable       *  (spontaneous vaginal delivery)  Assessment & Plan  - Pain well controlled with oral analgesics  - Voiding spontaneously  - Tolerating PO fluids and solids  - Ambulating without difficulty  - Contraception: undecided  - Breastfeeding  - Patient would like to go home today  - Safe for discharge    Retained products of conception after delivery without hemorrhage  Assessment & Plan  S/p multiple bimanual exams, banjo curettage -> US showing thin stripe  Afebrile      Gestational hypertension  Assessment & Plan  CBC/CMP wnl, P/C 0.2  Systolic (12hrs), Av , Min:142 , Max:142   Diastolic (12hrs), Av, Min:79, Max:79      Disposition    - Anticipate discharge home on PPD# 2      Subjective/Objective     Chief Complaint: Postpartum State     Subjective:    Zahra Herman is PPD#2 s/p  spontaneous vaginal delivery. She has some muscle  soreness from her lateral neck to her shoulders. She endorses back pain, which is slightly improved from yesterday.  Pain is well controlled.  Patient is currently voiding.  She is ambulating.  Patient is currently passing flatus and has had no bowel movement. She is tolerating PO, and denies nausea or vomitting. Patient denies fever, chills, chest pain, shortness of breath, or calf tenderness. Lochia is normal. She is  Breastfeeding. She is recovering well and is stable. Patient would like to go home today.       Vitals:   /79 (BP Location: Right arm)   Pulse 63   Temp 98.2 °F (36.8 °C) (Oral)   Resp 18   Ht 5' 3\" (1.6 m)   Wt 68.5 kg (151 lb)   LMP 2023   SpO2 98%   Breastfeeding Yes   BMI 26.75 kg/m²     No intake or output data in the 24 hours ending 24 0633    Invasive " Devices       Peripheral Intravenous Line  Duration             Peripheral IV 08/28/24 Left;Ventral (anterior) Forearm 1 day                    Physical Exam:   GEN: Zahra Herman appears well, alert and oriented x 3, pleasant and cooperative   CARDIO: RRR, no murmurs or rubs  RESP:  CTAB, no wheezes or rales  ABDOMEN: soft, no tenderness, no distention, fundus firm and non-tender @ 1cm below umbilicus  EXTREMITIES: non tender, no erythema      Labs:     Hemoglobin   Date Value Ref Range Status   08/28/2024 13.0 11.5 - 15.4 g/dL Final   08/14/2024 12.3 11.5 - 15.4 g/dL Final     WBC   Date Value Ref Range Status   08/28/2024 7.03 4.31 - 10.16 Thousand/uL Final   08/14/2024 7.63 4.31 - 10.16 Thousand/uL Final     Platelets   Date Value Ref Range Status   08/28/2024 236 149 - 390 Thousands/uL Final   08/14/2024 245 149 - 390 Thousands/uL Final     Creatinine   Date Value Ref Range Status   08/28/2024 0.61 0.60 - 1.30 mg/dL Final     Comment:     Standardized to IDMS reference method   05/23/2024 0.61 0.60 - 1.30 mg/dL Final     Comment:     Standardized to IDMS reference method     AST   Date Value Ref Range Status   08/28/2024 14 13 - 39 U/L Final   05/23/2024 11 (L) 13 - 39 U/L Final     ALT   Date Value Ref Range Status   08/28/2024 10 7 - 52 U/L Final     Comment:     Specimen collection should occur prior to Sulfasalazine administration due to the potential for falsely depressed results.    05/23/2024 10 7 - 52 U/L Final     Comment:     Specimen collection should occur prior to Sulfasalazine administration due to the potential for falsely depressed results.           Michael Reyes MD  8/30/2024  6:33 AM

## 2024-08-30 NOTE — LACTATION NOTE
This note was copied from a baby's chart.  CONSULT - LACTATION  Baby Girl Herman (Natalia) 2 days female MRN: [id number]    [facility] AN NURSERY Room / Bed: (N)/(N) Encounter: [id number]    Maternal Information     MOTHER:  Zahra Herman  Maternal Age: 39 y.o.  OB History: # 1 - Date: 05, Sex: Female, Weight: 2268 g (5 lb), GA: 37w0d, Type: Vaginal, Spontaneous, Apgar1: None, Apgar5: None, Living: Living, Birth Comments: None    # 2 - Date: , Sex: None, Weight: None, GA: 6w0d, Type: None, Apgar1: None, Apgar5: None, Living: None, Birth Comments: None    # 3 - Date: 08, Sex: Male, Weight: 3175 g (7 lb), GA: 38w0d, Type: Vaginal, Spontaneous, Apgar1: None, Apgar5: None, Living: Living, Birth Comments: None    # 4 - Date: 12, Sex: Female, Weight: 2722 g (6 lb), GA: 37w0d, Type: Vaginal, Spontaneous, Apgar1: None, Apgar5: None, Living: Living, Birth Comments: None    # 5 - Date: 24, Sex: Female, Weight: 3050 g (6 lb 11.6 oz), GA: 38w2d, Type: Vaginal, Spontaneous, Apgar1: 8, Apgar5: 9, Living: Living, Birth Comments: None   Previouse breast reduction surgery? No    Lactation history:   Has patient previously breast fed: Yes   How long had patient previously breast fed: over 1 year with 3 children   Previous breast feeding complications: None   No past surgical history on file.    Birth information:  YOB: 2026   Time of birth: 5:39 PM   Sex: female   Delivery type: Vaginal, Spontaneous   Birth Weight: 3050 g (6 lb 11.6 oz)   Percent of Weight Change: -4%     Gestational Age: 38w2d   [unfilled]    Assessment     Breast and nipple assessment:  bilateral nipples cracked and blistered     [place] Assessment: normal assessment    Feeding assessment: feeding well  LATCH:  Latch: Grasps breast, tongue down, lips flanged, rhythmic sucking   Audible Swallowing: Spontaneous and intermittent (24 hours old)   Type of Nipple: Everted (After  stimulation)   Comfort (Breast/Nipple): Engorged, cracked, bleeding, large blisters or bruises   Hold (Positioning): Partial assist, teach one side, mother does other, staff holds   LATCH Score: 7           24 1017   Lactation Consultation   Reason for Consult 20 min;20 minutes   Maternal Information   Has mother  before? Yes   How long did the the mother previously breastfeed? over 1 year with 3 children   Previous Maternal Breastfeeding Challenges None   Infant to breast within first hour of birth? Yes   Exclusive Pump and Bottle Feed No   LATCH Documentation   Latch 2   Audible Swallowing 2   Type of Nipple 2   Comfort (Breast/Nipple) 0   Hold (Positioning) 1   LATCH Score 7   Having latch problems? No   Position(s) Used Football   Breasts/Nipples   Left Breast Firm;Soft   Right Breast Firm;Soft   Left Nipple Blisters;Cracked;Everted   Right Nipple Blisters;Cracked;Everted   Intervention Lanolin   Breastfeeding Status Yes   Breastfeeding Progress Not yet established   Other OB Lactation Tools   Feeding Devices Lanolin   Breast Pump   Pump 3   Patient Follow-Up   Lactation Consult Status 2   Follow-Up Type Inpatient;Call as needed   Other OB Lactation Documentation    Additional Problem Noted Mom c/o sore nipples, moist wound healing to help. Baby cueing while in room. Mom latched baby shallowly in football hold. Repositioned mom and baby in football and latched deeply at breast. Demonstration with teach back effective. Enc to call for further assistance.     Feeding recommendations:  breast feed on demand  Mom c/o sore nipples, using moist wound healing to help. Baby cueing while in room. Mom latched baby shallowly in football hold. Repositioned mom and baby in football and latched deeply at breast. Demonstration with teach back effective. Reviewed cluster feedings with mom. Enc to call for further assistance.    Prior to LC   [place] Latch:  Efficiency:               Lips Flanged: No               Depth of latch: shallow just on nipple               Audible Swallow: No              Visible Milk: No              Wide Open/ Asymmetrical: No              Suck Swallow Cycle: Breathing: yes, Coordinated: yes  Nipple Assessment after latch: Open wound  or compressed   Latch Problems: mom bringing baby from under breast.     Position:  Infant's Ergonomics/Body               Body Alignment: No               Head Supported: Yes               Close to Mom's body/ Lifted/ Supported: No               Mom's Ergonomics/Body: No                           Supported: Yes                           Sitting Back: No                           Brings Baby to her breast: No  Positioning Problems: Mom bringing herself to baby.    After LC     [place] Latch:  Efficiency:               Lips Flanged: Yes              Depth of latch: deep              Audible Swallow: Yes              Visible Milk: No              Wide Open/ Asymmetrical: Yes              Suck Swallow Cycle: Breathing: yes, cuh sounds noted, Coordinated: yes  Nipple Assessment after latch: Normal: elongated/eraser, no discoloration and no damage noted.  Latch Problems: None     Position:  Infant's Ergonomics/Body               Body Alignment: Yes               Head Supported: Yes               Close to Mom's body/ Lifted/ Supported: Yes               Mom's Ergonomics/Body: Yes                           Supported: Yes                           Sitting Back: Yes                           Brings Baby to her breast: Yes  Positioning Problems: positioned mom using pillows to support baby and bring to breast level. Snug hold on baby.    Provided demonstration, education and support of deep latch to breast by placing the nipple to the nose, dragging down to chin to achieve a wide latch. Bring baby to the breast, not breast to baby. Move your shoulders down and away from your ears. Look for ear, shoulder, hip alignment. Baby's upper and lower lip should be flanged on the  breast.    Discussed 2nd night syndrome and ways to calm infant. Hand out given. Information on hand expression given. Discussed benefits of knowing how to manually express breast including stimulating milk supply, softening nipple for latch and evacuating breast in the event of engorgement.    Encouraged parents to call for assistance, questions, and concerns about breastfeeding.  Extension provided.        Ann Sanchez MA 8/30/2024 10:19 AM

## 2024-08-30 NOTE — PLAN OF CARE

## 2024-09-03 PROCEDURE — 88307 TISSUE EXAM BY PATHOLOGIST: CPT | Performed by: PATHOLOGY

## 2024-09-03 NOTE — PROGRESS NOTES
Post-Partum Checkup Visit    Zahra Herman is a 39 y.o.  female     Assessment:  Delivered a female  (6lb 12oz) via  on 24 after admission for gHTN, doing well today. She is not on antihypertensives. She does not take blood pressures at home.    She reports pain in the back of the head (intermittent) x 3 days. She takes motrin and tylenol; relieves with medication. She reports the pain is in the back of her head next to her neck and feels like tension. She reports it hurts when she moves neck from side to side. Denies blurry vision, visual changes, edema, or abdominal pain.     Plan:  Breastfeeding: continue breastfeeding  Activity: restricted until 6 weeks.  RTO for scheduled postpartum appt and PRN    Problem List Items Addressed This Visit        (spontaneous vaginal delivery) - Primary    Gestational hypertension     -Initial elevated /100  -Repeat 138/88  -Discussed head pain is suspicious for tension, but would recommend further evaluation with CBC/CMP. Reviewed with on-call physician, Dr. Regalado, who agrees.  -Advised to take tylenol/motrin for pain and apply intermittent heat/ice. If pain is unresolved in 3 hours, recommend ED evaluation.  -Provided Pre-e precautions to be seen immediately for visual changes, dizziness, blurry vision, or swelling.          Relevant Orders    CBC and differential    Comprehensive metabolic panel       Subjective/Objective     Subjective:   Pain: no, well controlled  Tolerating Oral Intake: yes  Voiding: yes  Flatus: yes  Bowel Movement: yes  Ambulating: yes  Breastfeeding: Breastfeeding   Chest Pain: no  Shortness of Breath: no  Leg Pain/Discomfort: no  Lochia: normal     Objective:     Postpartum Depression: Low Risk  (2024)    [place]  Depression Scale     Last EPDS Total Score: 1     Last EPDS Self Harm Result: Never       Vitals:  Vitals:    24 1256   BP: 152/100       Physical Exam:  Physical Exam  Vitals and  nursing note reviewed.   Constitutional:       General: She is not in acute distress.     Appearance: Normal appearance.   HENT:      Head: Normocephalic.   Eyes:      Conjunctiva/sclera: Conjunctivae normal.   Cardiovascular:      Rate and Rhythm: Normal rate and regular rhythm.      Heart sounds: Normal heart sounds.   Pulmonary:      Effort: Pulmonary effort is normal.      Breath sounds: Normal breath sounds.   Abdominal:      General: Abdomen is flat.      Palpations: Abdomen is soft.   Musculoskeletal:         General: Normal range of motion.      Cervical back: Normal range of motion.      Right lower leg: No edema.      Left lower leg: No edema.   Lymphadenopathy:      Cervical: No cervical adenopathy.   Skin:     General: Skin is warm and dry.   Neurological:      Mental Status: She is alert and oriented to person, place, and time.   Psychiatric:         Mood and Affect: Mood normal.         Behavior: Behavior normal.         Thought Content: Thought content normal.         Judgment: Judgment normal.           OB Hx:  OB History    Para Term  AB Living   5 4 4 0 1 4   SAB IAB Ectopic Multiple Live Births   1 0 0 0 4      # Outcome Date GA Lbr Nathaniel/2nd Weight Sex Type Anes PTL Lv   5 Term 24 38w2d / 00:11 3050 g (6 lb 11.6 oz) F Vag-Spont EPI N CURT      Name: Angeles Padgett      Apgar1: 8  Apgar5: 9   4 Term 12 37w0d  2722 g (6 lb) F Vag-Spont   CURT   3 Term 08 38w0d  3175 g (7 lb) M Vag-Spont   CURT   2 SAB  6w0d          1 Term 05 37w0d  2268 g (5 lb) F Vag-Spont   CURT     Medical Hx  Past Medical History:   Diagnosis Date    Migraine     Miscarriage      Surgical Hx  History reviewed. No pertinent surgical history.  Family Hx  Family History   Problem Relation Age of Onset    Diabetes Mother     Stroke Mother     Stroke Father      Social Hx  Social History     Socioeconomic History    Marital status: Single     Spouse name: Not on file    Number of  children: Not on file    Years of education: Not on file    Highest education level: Not on file   Occupational History    Not on file   Tobacco Use    Smoking status: Some Days     Types: Cigarettes    Smokeless tobacco: Never   Vaping Use    Vaping status: Never Used   Substance and Sexual Activity    Alcohol use: Not Currently    Drug use: Never    Sexual activity: Not Currently     Partners: Male   Other Topics Concern    Not on file   Social History Narrative    Not on file     Social Determinants of Health     Financial Resource Strain: Not on file   Food Insecurity: Patient Declined (8/28/2024)    Hunger Vital Sign     Worried About Running Out of Food in the Last Year: Patient declined     Ran Out of Food in the Last Year: Patient declined   Transportation Needs: No Transportation Needs (8/28/2024)    PRAPARE - Transportation     Lack of Transportation (Medical): No     Lack of Transportation (Non-Medical): No   Physical Activity: Not on file   Stress: Not on file   Social Connections: Not on file   Intimate Partner Violence: Not on file   Housing Stability: Low Risk  (8/28/2024)    Housing Stability Vital Sign     Unable to Pay for Housing in the Last Year: No     Number of Times Moved in the Last Year: 1     Homeless in the Last Year: No     Allergies  No Known Allergies    OB/GYN  9/6/2024  1:23 PM

## 2024-09-03 NOTE — UTILIZATION REVIEW
"Mother and baby discharged on 2024      NOTIFICATION OF INPATIENT ADMISSION   MATERNITY/DELIVERY AUTHORIZATION REQUEST   SERVICING FACILITY:   [facility] - L&D, , NICU  [address] [place] [address]  Tax ID: [id number]  NPI: [id number]   ATTENDING PROVIDER:  Attending Name and NPI#: Azra Regalado Md [[id number]]  Address: [address] [place] [address]  Phone: [phone]   ADMISSION INFORMATION:  Place of Service: Inpatient [facility]  Place of Service Code: 21  Inpatient Admission Date/Time: 24 12:36 PM  Discharge Date/Time: 2024  2:51 PM  Admitting Diagnosis Code/Description:  Pregnant [Z34.90]  Uterine contractions at greater than 20 weeks of gestation [O47.9]  38 weeks gestation of pregnancy [Z3A.38]  Encounter for full-term uncomplicated delivery [O80]     Mother: Zahra Herman 1985 Estimated Date of Delivery: 24  Delivering clinician: Azra Regalado   OB History          5    Para   4    Term   4            AB   1    Living   4         SAB   1    IAB        Ectopic        Multiple   0    Live Births   4                Name & MRN:   Information for the patient's :  Shady Barreran [[id number]]    Delivery Information:  Sex: female  Delivered 2024 5:39 PM by Vaginal, Spontaneous; Gestational Age: 38w2d    [place] Measurements:  Weight: 6 lb 11.6 oz (3050 g);  Height: 18\"    APGAR 1 minute 5 minutes 10 minutes   Totals: 8 9       UTILIZATION REVIEW CONTACT:  Anuradha Zaragoza Utilization   Network Utilization Review Department  Phone: [phone]  Fax [phone]  Email: [email or web address]  Contact for approvals/pending authorizations, clinical reviews, and discharge.     PHYSICIAN ADVISORY SERVICES:  Medical Necessity Denial & [place] Review  Phone: [phone]  Fax: [phone]  Email: " [email or web address]     DISCHARGE SUPPORT TEAM:  For Patients Discharge Needs & Updates  Phone: [phone] opt. 2 Fax: [phone]  Email: [email or web address]

## 2024-09-06 ENCOUNTER — APPOINTMENT (OUTPATIENT)
Dept: LAB | Facility: AMBULARY SURGERY CENTER | Age: 39
End: 2024-09-06
Payer: COMMERCIAL

## 2024-09-06 ENCOUNTER — OFFICE VISIT (OUTPATIENT)
Dept: OBGYN CLINIC | Facility: CLINIC | Age: 39
End: 2024-09-06

## 2024-09-06 VITALS
DIASTOLIC BLOOD PRESSURE: 100 MMHG | HEIGHT: 63 IN | WEIGHT: 134 LBS | SYSTOLIC BLOOD PRESSURE: 152 MMHG | BODY MASS INDEX: 23.74 KG/M2

## 2024-09-06 DIAGNOSIS — O13.9 GESTATIONAL HYPERTENSION, ANTEPARTUM: ICD-10-CM

## 2024-09-06 LAB
ALBUMIN SERPL BCG-MCNC: 3.6 G/DL (ref 3.5–5)
ALP SERPL-CCNC: 103 U/L (ref 34–104)
ALT SERPL W P-5'-P-CCNC: 24 U/L (ref 7–52)
ANION GAP SERPL CALCULATED.3IONS-SCNC: 9 MMOL/L (ref 4–13)
AST SERPL W P-5'-P-CCNC: 22 U/L (ref 13–39)
BASOPHILS # BLD AUTO: 0.05 THOUSANDS/ÂΜL (ref 0–0.1)
BASOPHILS NFR BLD AUTO: 1 % (ref 0–1)
BILIRUB SERPL-MCNC: 0.25 MG/DL (ref 0.2–1)
BUN SERPL-MCNC: 13 MG/DL (ref 5–25)
CALCIUM SERPL-MCNC: 9.2 MG/DL (ref 8.4–10.2)
CHLORIDE SERPL-SCNC: 106 MMOL/L (ref 96–108)
CO2 SERPL-SCNC: 23 MMOL/L (ref 21–32)
CREAT SERPL-MCNC: 0.61 MG/DL (ref 0.6–1.3)
EOSINOPHIL # BLD AUTO: 0.21 THOUSAND/ÂΜL (ref 0–0.61)
EOSINOPHIL NFR BLD AUTO: 3 % (ref 0–6)
ERYTHROCYTE [DISTWIDTH] IN BLOOD BY AUTOMATED COUNT: 13.2 % (ref 11.6–15.1)
GFR SERPL CREATININE-BSD FRML MDRD: 112 ML/MIN/1.73SQ M
GLUCOSE SERPL-MCNC: 95 MG/DL (ref 65–140)
HCT VFR BLD AUTO: 40.8 % (ref 34.8–46.1)
HGB BLD-MCNC: 13.2 G/DL (ref 11.5–15.4)
IMM GRANULOCYTES # BLD AUTO: 0.02 THOUSAND/UL (ref 0–0.2)
IMM GRANULOCYTES NFR BLD AUTO: 0 % (ref 0–2)
LYMPHOCYTES # BLD AUTO: 1.19 THOUSANDS/ÂΜL (ref 0.6–4.47)
LYMPHOCYTES NFR BLD AUTO: 19 % (ref 14–44)
MCH RBC QN AUTO: 29.8 PG (ref 26.8–34.3)
MCHC RBC AUTO-ENTMCNC: 32.4 G/DL (ref 31.4–37.4)
MCV RBC AUTO: 92 FL (ref 82–98)
MONOCYTES # BLD AUTO: 0.68 THOUSAND/ÂΜL (ref 0.17–1.22)
MONOCYTES NFR BLD AUTO: 11 % (ref 4–12)
NEUTROPHILS # BLD AUTO: 4.08 THOUSANDS/ÂΜL (ref 1.85–7.62)
NEUTS SEG NFR BLD AUTO: 66 % (ref 43–75)
NRBC BLD AUTO-RTO: 0 /100 WBCS
PLATELET # BLD AUTO: 382 THOUSANDS/UL (ref 149–390)
PMV BLD AUTO: 10 FL (ref 8.9–12.7)
POTASSIUM SERPL-SCNC: 4.1 MMOL/L (ref 3.5–5.3)
PROT SERPL-MCNC: 6.8 G/DL (ref 6.4–8.4)
RBC # BLD AUTO: 4.43 MILLION/UL (ref 3.81–5.12)
SODIUM SERPL-SCNC: 138 MMOL/L (ref 135–147)
WBC # BLD AUTO: 6.23 THOUSAND/UL (ref 4.31–10.16)

## 2024-09-06 PROCEDURE — 80053 COMPREHEN METABOLIC PANEL: CPT

## 2024-09-06 PROCEDURE — 36415 COLL VENOUS BLD VENIPUNCTURE: CPT

## 2024-09-06 PROCEDURE — 85025 COMPLETE CBC W/AUTO DIFF WBC: CPT

## 2024-09-06 PROCEDURE — 99024 POSTOP FOLLOW-UP VISIT: CPT

## 2024-09-06 NOTE — ASSESSMENT & PLAN NOTE
-Initial elevated /100  -Repeat 138/88  -Discussed head pain is suspicious for tension, but would recommend further evaluation with CBC/CMP. Reviewed with on-call physician, Dr. Regalado, who agrees.  -Advised to take tylenol/motrin for pain and apply intermittent heat/ice. If pain is unresolved in 3 hours, recommend ED evaluation.  -Provided Pre-e precautions to be seen immediately for visual changes, dizziness, blurry vision, or swelling.

## 2024-10-01 ENCOUNTER — POSTPARTUM VISIT (OUTPATIENT)
Dept: OBGYN CLINIC | Facility: CLINIC | Age: 39
End: 2024-10-01
Payer: COMMERCIAL

## 2024-10-01 VITALS
DIASTOLIC BLOOD PRESSURE: 84 MMHG | HEIGHT: 63 IN | WEIGHT: 135 LBS | BODY MASS INDEX: 23.92 KG/M2 | SYSTOLIC BLOOD PRESSURE: 130 MMHG

## 2024-10-01 DIAGNOSIS — Z30.011 ENCOUNTER FOR INITIAL PRESCRIPTION OF CONTRACEPTIVE PILLS: ICD-10-CM

## 2024-10-01 RX ORDER — ACETAMINOPHEN AND CODEINE PHOSPHATE 120; 12 MG/5ML; MG/5ML
1 SOLUTION ORAL DAILY
Qty: 84 TABLET | Refills: 1 | Status: SHIPPED | OUTPATIENT
Start: 2024-10-01

## 2024-10-01 RX ORDER — MUPIROCIN 20 MG/G
OINTMENT TOPICAL
COMMUNITY
Start: 2024-09-20

## 2024-10-01 NOTE — PROGRESS NOTES
Postpartum Visit  MD Miki    10/01/24      Subjective     Zahra Herman is a 39 y.o.  female who presents for a postpartum visit. She is s/p  at 38w2d on 24.    Term 24 38w2d / 0h 11m 3050 g (6 lb 11.6 oz) F Vag-Spont Epidural N Living 8 9    Name: Angeles Padgett   [facility]   Delivering Clinician: Azra Regalado MD          Baby is feeding by breast    Lochia is staining only.   Bowel function is normal.   Bladder function is normal.   Patient has not been sexually active.   Desired contraception method is IUD--had ParaGard and Mirena, will use condoms until placemnet      Postpartum Depression: Low Risk  (10/1/2024)    [place]  Depression Scale     Last EPDS Total Score: 0     Last EPDS Self Harm Result: Never      Gestational Diabetes: no  Gestational HTN/Preeclampsia: ghtn, recommended nifedipine, declined, normotensive today      The following portions of the patient's history were reviewed and updated as appropriate: allergies, current medications, past medical history, past social history, and problem list.      Current Outpatient Medications:     ibuprofen (MOTRIN) 600 mg tablet, Take 1 tablet (600 mg total) by mouth every 6 (six) hours as needed for moderate pain or mild pain for up to 7 days, Disp: , Rfl:     norethindrone (Bhavya) 0.35 MG tablet, Take 1 tablet (0.35 mg total) by mouth daily, Disp: 84 tablet, Rfl: 1    Prenat MV-Min w/Fe-Folate-DHA (Prenatal Complete) CPPK, Take by mouth, Disp: , Rfl:     benzocaine-menthol-lanolin-aloe (DERMOPLAST) 20-0.5 % topical spray, Apply 1 Application topically every 6 (six) hours as needed for irritation or mild pain (Patient not taking: Reported on 10/1/2024), Disp: , Rfl:     hydrocortisone 1 % cream, Apply 1 Application topically daily as needed for irritation or rash (Patient not taking: Reported on 2024), Disp: , Rfl:     mupirocin (BACTROBAN) 2 % ointment,  ", Disp: , Rfl:     witch hazel-glycerin (TUCKS) topical pad, Apply 1 Pad topically every 4 (four) hours as needed for irritation (Patient not taking: Reported on 2024), Disp: , Rfl:     No Known Allergies    Review of Systems  Per HPI    Objective      /84 (BP Location: Left arm, Patient Position: Sitting, Cuff Size: Standard)   Ht 5' 3\" (1.6 m)   Wt 61.2 kg (135 lb)   LMP 2023   Breastfeeding Yes   BMI 23.91 kg/m²   Physical Exam  HENT:      Head: Normocephalic and atraumatic.      Nose: Nose normal.   Eyes:      Extraocular Movements: Extraocular movements intact.      Conjunctiva/sclera: Conjunctivae normal.   Pulmonary:      Effort: Pulmonary effort is normal.   Abdominal:      General: There is no distension.      Palpations: Abdomen is soft.   Genitourinary:     Comments: Vulva: normal, no lesions  Vagina: normal, no lesions or ttp, no bleeding or discharge  Urethra: normal, no lesions, masses or ttp    Musculoskeletal:         General: Normal range of motion.      Cervical back: Normal range of motion.   Skin:     General: Skin is warm and dry.   Neurological:      General: No focal deficit present.      Mental Status: She is alert.   Psychiatric:         Mood and Affect: Mood normal.         Behavior: Behavior normal.         Thought Content: Thought content normal.           Assessment/Plan:  Zahra Hemran is a 39 y.o. who is postpartum from an  with a normal postpartum examination.    1. Contraception: had been considering IUD however submucosal component to fibroid increased risk of malpositioning and distortion or expulsion or need for Hsc removal. Discussed nexplanon. Desires POPs  2. Annual exam due in February; Last Pap 2024: NILM, HPV neg   3. Increase activity as tolerated, may resume all normal activity at 6 weeks      "

## 2024-10-15 ENCOUNTER — PROCEDURE VISIT (OUTPATIENT)
Dept: OBGYN CLINIC | Facility: CLINIC | Age: 39
End: 2024-10-15
Payer: COMMERCIAL

## 2024-10-15 VITALS
BODY MASS INDEX: 24.45 KG/M2 | DIASTOLIC BLOOD PRESSURE: 80 MMHG | SYSTOLIC BLOOD PRESSURE: 108 MMHG | WEIGHT: 138 LBS | HEIGHT: 63 IN

## 2024-10-15 DIAGNOSIS — Z30.430 ENCOUNTER FOR IUD INSERTION: Primary | ICD-10-CM

## 2024-10-15 LAB — SL AMB POCT URINE HCG: NORMAL

## 2024-10-15 PROCEDURE — 58300 INSERT INTRAUTERINE DEVICE: CPT | Performed by: PHYSICIAN ASSISTANT

## 2024-10-15 PROCEDURE — 81025 URINE PREGNANCY TEST: CPT | Performed by: PHYSICIAN ASSISTANT

## 2024-10-15 RX ORDER — COPPER 313.4 MG/1
1 INTRAUTERINE DEVICE INTRAUTERINE
Status: COMPLETED | OUTPATIENT
Start: 2024-10-15 | End: 2024-10-15

## 2024-10-15 RX ADMIN — COPPER 1 EACH: 313.4 INTRAUTERINE DEVICE INTRAUTERINE at 10:29

## 2024-10-15 NOTE — PROGRESS NOTES
IUD Procedure    Date/Time: 10/15/2024 10:29 AM    Performed by: Denia Barboza PA-C  Authorized by: Denia Barboza PA-C    Other Assisting Provider: No    Verbal consent obtained?: Yes    Risks and benefits: Risks, benefits and alternatives were discussed    Consent given by:  Patient  Time Out:     Time out: Immediately prior to the procedure a time out was called    Patient states understanding of procedure being performed: Yes    Patient's understanding of procedure matches consent: Yes    Procedure consent matches procedure scheduled: Yes    Relevant documents present and verified: Yes    Radiology Images displayed and confirmed. If images not available, report reviewed: Yes    Required items: Required blood products, implants, devices and special equipment available    Patient identity confirmed:  Verbally with patient  Select procedure: IUD insertion    IUD Insertion:     Pelvic exam performed: yes      Negative GC/chlamydia test: no      Negative urine pregnancy test: yes      Negative serum pregnancy test: no      Cervix cleaned and prepped: yes      Speculum placed in vagina: yes      Tenaculum applied to cervix: no      Allis applied to cervix: yes      IUD inserted with no complications: yes      Strings trimmed: yes      Uterus sounded: yes      Uterus sound depth (cm):  8    IUD type:  1 each intrauterine copper  Post-procedure:     Patient tolerated procedure well: yes      Patient will follow up after next period: no    Insertion Comments:      Pt for IUD insertion today. Pt has already been previously counseled on all risks/benefits of IUD usage/insertion.  I again reviewed with pt risks of insertion including infection, perforation and expulsion. Reviewed with pt increased ectopic risk, migration, PID, infertility, high risk pregnancy if a pregnancy were to occur. I reviewed possible irregular menses and side effects of Mirena. We reviewed changes in menstrual cycles that may occur.   Pt aware 10  year coverage for birth control but can have it removed at any point if she desires.   Pt tolerated procedure well. Aware nothing PV x 48 hours. Will call with any cramping that does not resolve, fevers/chills. Etc.

## 2024-12-10 ENCOUNTER — HOSPITAL ENCOUNTER (EMERGENCY)
Facility: HOSPITAL | Age: 39
Discharge: HOME | End: 2024-12-10
Attending: EMERGENCY MEDICINE
Payer: COMMERCIAL

## 2024-12-10 ENCOUNTER — APPOINTMENT (EMERGENCY)
Dept: RADIOLOGY | Facility: HOSPITAL | Age: 39
End: 2024-12-10
Payer: COMMERCIAL

## 2024-12-10 VITALS
SYSTOLIC BLOOD PRESSURE: 137 MMHG | HEART RATE: 87 BPM | RESPIRATION RATE: 18 BRPM | OXYGEN SATURATION: 97 % | TEMPERATURE: 98.3 F | DIASTOLIC BLOOD PRESSURE: 80 MMHG

## 2024-12-10 DIAGNOSIS — J20.9 ACUTE BRONCHITIS, UNSPECIFIED ORGANISM: Primary | ICD-10-CM

## 2024-12-10 LAB
FLUAV RNA RESP QL NAA+PROBE: NEGATIVE
FLUBV RNA RESP QL NAA+PROBE: NEGATIVE
RSV RNA RESP QL NAA+PROBE: NEGATIVE
SARS-COV-2 RNA RESP QL NAA+PROBE: NEGATIVE

## 2024-12-10 PROCEDURE — 0241U HB NFCT DS VIR RESP RNA 4 TRGT: CPT | Performed by: PHYSICIAN ASSISTANT

## 2024-12-10 PROCEDURE — 71046 X-RAY EXAM CHEST 2 VIEWS: CPT

## 2024-12-10 PROCEDURE — 99284 EMERGENCY DEPT VISIT MOD MDM: CPT | Performed by: PHYSICIAN ASSISTANT

## 2024-12-10 PROCEDURE — 99284 EMERGENCY DEPT VISIT MOD MDM: CPT

## 2024-12-10 RX ORDER — AZITHROMYCIN 250 MG/1
TABLET, FILM COATED ORAL
Qty: 6 TABLET | Refills: 0 | Status: SHIPPED | OUTPATIENT
Start: 2024-12-10 | End: 2024-12-14

## 2024-12-10 RX ORDER — ALBUTEROL SULFATE 90 UG/1
2 INHALANT RESPIRATORY (INHALATION) EVERY 4 HOURS PRN
Qty: 8 G | Refills: 0 | Status: SHIPPED | OUTPATIENT
Start: 2024-12-10

## 2024-12-10 RX ORDER — ALBUTEROL SULFATE 0.83 MG/ML
5 SOLUTION RESPIRATORY (INHALATION) ONCE
Status: COMPLETED | OUTPATIENT
Start: 2024-12-10 | End: 2024-12-10

## 2024-12-10 RX ADMIN — IPRATROPIUM BROMIDE 0.5 MG: 0.5 SOLUTION RESPIRATORY (INHALATION) at 11:31

## 2024-12-10 RX ADMIN — ALBUTEROL SULFATE 5 MG: 2.5 SOLUTION RESPIRATORY (INHALATION) at 11:31

## 2024-12-10 NOTE — DISCHARGE INSTRUCTIONS
Use Tylenol every 4 hours or Motrin every 6 hours; you can alternate the 2 medications taking something every 3 hours for pain or fever.      Take all oral antibiotics until done.      You can use Prescription cough medication to help with symptoms.   Use Albuterol MDI 2 puffs every 3-4 hours as needed for cough/wheezing    If no improvement follow-up with your doctor in next few days.

## 2024-12-10 NOTE — ED PROVIDER NOTES
Time reflects when diagnosis was documented in both MDM as applicable and the Disposition within this note       Time User Action Codes Description Comment    12/10/2024 12:46 PM Julio CésarTeresa Add [J20.9] Acute bronchitis, unspecified organism           ED Disposition       ED Disposition   Discharge    Condition   Stable    Date/Time   Tue Dec 10, 2024 12:46 PM    Comment   Zahra Herman discharge to home/self care.                   Assessment & Plan       Medical Decision Making  Patient with persistent cough, URI symptoms considered viral illness, RSV, pneumonia, bronchitis, sinusitis, rhinitis  Viral panel negative, chest x-ray does not show any infiltrate but patient with rhonchi on exam will cover with antibiotics, cough medicines, patient given prescription for albuterol and spacer was given here to help treat bronchospasm, wheezing  Vital signs stable, afebrile, pulse ox 94% stable for discharge outpatient follow-up    Amount and/or Complexity of Data Reviewed  Labs: ordered. Decision-making details documented in ED Course.  Radiology: ordered. Decision-making details documented in ED Course.    Risk  OTC drugs.  Prescription drug management.        ED Course as of 12/10/24 1457   Tue Dec 10, 2024   1219 Viral panel neg       Medications   albuterol inhalation solution 5 mg (5 mg Nebulization Given 12/10/24 1131)   ipratropium (ATROVENT) 0.02 % inhalation solution 0.5 mg (0.5 mg Nebulization Given 12/10/24 1131)       ED Risk Strat Scores                                               History of Present Illness       Chief Complaint   Patient presents with    Cold Like Symptoms     Body aches, sore throat, cough       Past Medical History:   Diagnosis Date    Migraine     Miscarriage       History reviewed. No pertinent surgical history.   Family History   Problem Relation Age of Onset    Diabetes Mother     Stroke Mother     Stroke Father       Social History     Tobacco Use    Smoking status: Former      Types: Cigarettes    Smokeless tobacco: Never   Vaping Use    Vaping status: Never Used   Substance Use Topics    Alcohol use: Not Currently     Comment: social    Drug use: Never      E-Cigarette/Vaping    E-Cigarette Use Never User       E-Cigarette/Vaping Substances    Nicotine No     THC No     CBD No     Flavoring No     Other No     Unknown No       I have reviewed and agree with the history as documented.     HPI: Patient is a 39 y.o. female who presents with over 1 week h/o of cough, now productive, sore throat last week now better, diffuse myalgias, congestion, no fever, no sob which the patient describes at mild.   The patient has tried OTC meds.    No Known Allergies    Past Medical History:  No date: Migraine  No date: Miscarriage   History reviewed. No pertinent surgical history.  Social History    Tobacco Use      Smoking status: Former        Types: Cigarettes      Smokeless tobacco: Never    Vaping Use      Vaping status: Never Used    Alcohol use: Not Currently      Comment: social    Drug use: Never      Nursing notes reviewed  Physical Exam:  ED Triage Vitals [12/10/24 1036]  Temperature: 98.3 °F (36.8 °C)  Pulse: 87  Respirations: 18  Blood Pressure: 137/80  SpO2: 97 %  Temp Source: Oral  Heart Rate Source: Monitor  Patient Position - Orthostatic VS: Lying  BP Location: Right arm  FiO2 (%): n/a  Pain Score: n/a    ROS: Positive for cough, sore throat, myalgias, the remainder of a 10 organ system ROS was otherwise unremarkable.    General: awake, alert, mild distress, afebrile  Head: normocephalic, atraumatic  Eyes: no scleral icterus  Ears: external ears normal, hearing grossly intact, TMs intact bilaterally, no erythema, no bulging  Nose: external exam grossly normal, positive nasal discharge  Pharynx: No erythema, no exudate uvula midline no shift  Neck: symmetric, No JVD noted, trachea midline  Pulmonary: no respiratory distress, no tachypnea noted, rhonchi on right, bilateral late expiratory  wheezing  Cardiovascular: appears well perfused  Abdomen: no distention noted, nontender  Musculoskeletal: no deformities noted, tone normal  Neuro: grossly non-focal  Psych: mood and affect appropriate    The patient is stable and has a history and physical exam consistent with a viral illness. Viral panel testing has been performed.  I considered the patient's other medical conditions as applicable/noted above in my medical decision making.  The patient is stable upon discharge. The plan is for supportive care at home.    The patient (and any family present) verbalized understanding of the discharge instructions and warnings that would necessitate return to the Emergency Department.  All questions were answered prior to discharge.    Medications  albuterol inhalation solution 5 mg (5 mg Nebulization Given 12/10/24 1131)  ipratropium (ATROVENT) 0.02 % inhalation solution 0.5 mg (0.5 mg Nebulization Given 12/10/24 1131)  Final diagnoses:  None  ED Disposition     None      Follow-up Information    None     Patient's Medications    Discharge Prescriptions  No medications on file    No discharge procedures on file.    Electronically Signed by            Review of Systems    See above    Objective       ED Triage Vitals [12/10/24 1036]   Temperature Pulse Blood Pressure Respirations SpO2 Patient Position - Orthostatic VS   98.3 °F (36.8 °C) 87 137/80 18 97 % Lying      Temp Source Heart Rate Source BP Location FiO2 (%) Pain Score    Oral Monitor Right arm -- --      Vitals      Date and Time Temp Pulse SpO2 Resp BP Pain Score FACES Pain Rating User   12/10/24 1036 98.3 °F (36.8 °C) 87 97 % 18 137/80 -- -- BL            Physical Exam    See above    Results Reviewed       Procedure Component Value Units Date/Time    FLU/RSV/COVID - if FLU/RSV clinically relevant (2hr TAT) [[id number]]  (Normal) Collected: 12/10/24 1102    Lab Status: Final result Specimen: Nares from Nose Updated: 12/10/24 1152     SARS-CoV-2 Negative      INFLUENZA A PCR Negative     INFLUENZA B PCR Negative     RSV PCR Negative    Narrative:      This test has been performed using the CoV-2/Flu/RSV plus assay on the [employer] GeneXpert platform. This test has been validated by the  and verified by the performing laboratory.     This test is designed to amplify and detect the following: nucleocapsid (N), envelope (E), and RNA-dependent RNA polymerase (RdRP) genes of the SARS-CoV-2 genome; matrix (M), basic polymerase (PB2), and acidic protein (PA) segments of the influenza A genome; matrix (M) and non-structural protein (NS) segments of the influenza B genome, and the nucleocapsid genes of RSV A and RSV B.     Positive results are indicative of the presence of Flu A, Flu B, RSV, and/or SARS-CoV-2 RNA. Positive results for SARS-CoV-2 or suspected novel influenza should be reported to state, local, or federal health departments according to local reporting requirements.      All results should be assessed in conjunction with clinical presentation and other laboratory markers for clinical management.     FOR PEDIATRIC PATIENTS - copy/paste COVID Guidelines URL to browser: [email or web address]               XR chest 2 views   Final Interpretation by César Ernandez MD (12/10 1215)      No acute cardiopulmonary disease.            Workstation performed: [id number]             Procedures    ED Medication and Procedure Management   Prior to Admission Medications   Prescriptions Last Dose Informant Patient Reported? Taking?   Prenat MV-Min w/Fe-Folate-DHA (Prenatal Complete) CPPK   Yes No   Sig: Take by mouth   ibuprofen (MOTRIN) 600 mg tablet   No No   Sig: Take 1 tablet (600 mg total) by mouth every 6 (six) hours as needed for moderate pain or mild pain for up to 7 days      Facility-Administered Medications: None     Discharge Medication List as of 12/10/2024 12:56 PM        START taking these  medications    Details   albuterol (ProAir HFA) 90 mcg/act inhaler Inhale 2 puffs every 4 (four) hours as needed for wheezing, Starting Tue 12/10/2024, Normal      azithromycin (Zithromax Z-Matheus) 250 mg tablet Take 2 tablets today then 1 tablet daily x 4 days, Normal      Dextromethorphan-guaiFENesin  MG/5ML SYRP Take 5 mL by mouth every 4 (four) hours as needed (cough), Starting Tue 12/10/2024, Normal           CONTINUE these medications which have NOT CHANGED    Details   ibuprofen (MOTRIN) 600 mg tablet Take 1 tablet (600 mg total) by mouth every 6 (six) hours as needed for moderate pain or mild pain for up to 7 days, Starting Fri 8/30/2024, Until Tue 10/15/2024 at 2359, No Print      Prenat MV-Min w/Fe-Folate-DHA (Prenatal Complete) CPPK Take by mouth, Historical Med           No discharge procedures on file.  ED SEPSIS DOCUMENTATION   Time reflects when diagnosis was documented in both MDM as applicable and the Disposition within this note       Time User Action Codes Description Comment    12/10/2024 12:46 PM Teresa Angela Add [J20.9] Acute bronchitis, unspecified organism                  Teresa Angela PA-C  12/10/24 1562     Methotrexate Pregnancy And Lactation Text: This medication is Pregnancy Category X and is known to cause fetal harm. This medication is excreted in breast milk.